# Patient Record
Sex: MALE | Race: WHITE | NOT HISPANIC OR LATINO
[De-identification: names, ages, dates, MRNs, and addresses within clinical notes are randomized per-mention and may not be internally consistent; named-entity substitution may affect disease eponyms.]

---

## 2017-02-06 ENCOUNTER — APPOINTMENT (OUTPATIENT)
Dept: PULMONOLOGY | Facility: CLINIC | Age: 82
End: 2017-02-06

## 2017-03-22 ENCOUNTER — APPOINTMENT (OUTPATIENT)
Dept: UROLOGY | Facility: CLINIC | Age: 82
End: 2017-03-22

## 2017-03-22 DIAGNOSIS — N20.0 CALCULUS OF KIDNEY: ICD-10-CM

## 2017-03-22 DIAGNOSIS — R31.29 OTHER MICROSCOPIC HEMATURIA: ICD-10-CM

## 2017-03-22 DIAGNOSIS — R35.0 FREQUENCY OF MICTURITION: ICD-10-CM

## 2017-04-26 ENCOUNTER — APPOINTMENT (OUTPATIENT)
Dept: PULMONOLOGY | Facility: CLINIC | Age: 82
End: 2017-04-26

## 2017-04-26 VITALS
RESPIRATION RATE: 15 BRPM | HEIGHT: 69 IN | BODY MASS INDEX: 21.03 KG/M2 | WEIGHT: 142 LBS | DIASTOLIC BLOOD PRESSURE: 76 MMHG | HEART RATE: 61 BPM | SYSTOLIC BLOOD PRESSURE: 126 MMHG | OXYGEN SATURATION: 98 %

## 2017-04-26 RX ORDER — LISINOPRIL 20 MG/1
20 TABLET ORAL
Qty: 90 | Refills: 0 | Status: ACTIVE | COMMUNITY
Start: 2016-01-21

## 2017-04-26 RX ORDER — AZELASTINE HYDROCHLORIDE 137 UG/1
0.1 SPRAY, METERED NASAL TWICE DAILY
Qty: 3 | Refills: 1 | Status: ACTIVE | COMMUNITY
Start: 2017-04-26 | End: 1900-01-01

## 2017-04-26 RX ORDER — SIMVASTATIN 80 MG/1
80 TABLET, FILM COATED ORAL
Qty: 90 | Refills: 0 | Status: ACTIVE | COMMUNITY
Start: 2016-05-03

## 2017-04-26 RX ORDER — ALPRAZOLAM 0.5 MG/1
0.5 TABLET ORAL
Qty: 90 | Refills: 0 | Status: ACTIVE | COMMUNITY
Start: 2017-01-10

## 2017-04-26 RX ORDER — MUPIROCIN 20 MG/G
2 OINTMENT TOPICAL
Qty: 22 | Refills: 0 | Status: ACTIVE | COMMUNITY
Start: 2017-03-07

## 2017-04-26 RX ORDER — CARVEDILOL 3.12 MG/1
3.12 TABLET, FILM COATED ORAL
Qty: 180 | Refills: 0 | Status: ACTIVE | COMMUNITY
Start: 2016-11-25

## 2017-06-22 ENCOUNTER — APPOINTMENT (OUTPATIENT)
Dept: PULMONOLOGY | Facility: CLINIC | Age: 82
End: 2017-06-22

## 2017-06-22 ENCOUNTER — MEDICATION RENEWAL (OUTPATIENT)
Age: 82
End: 2017-06-22

## 2017-06-22 VITALS
RESPIRATION RATE: 15 BRPM | SYSTOLIC BLOOD PRESSURE: 135 MMHG | DIASTOLIC BLOOD PRESSURE: 70 MMHG | HEIGHT: 69 IN | OXYGEN SATURATION: 93 % | WEIGHT: 142 LBS | HEART RATE: 71 BPM | BODY MASS INDEX: 21.03 KG/M2

## 2017-07-10 RX ORDER — OXYBUTYNIN CHLORIDE 10 MG/1
10 TABLET, EXTENDED RELEASE ORAL DAILY
Qty: 90 | Refills: 3 | Status: ACTIVE | COMMUNITY
Start: 2017-07-10 | End: 1900-01-01

## 2017-07-20 ENCOUNTER — MEDICATION RENEWAL (OUTPATIENT)
Age: 82
End: 2017-07-20

## 2017-07-20 RX ORDER — MIRABEGRON 50 MG/1
50 TABLET, FILM COATED, EXTENDED RELEASE ORAL
Qty: 90 | Refills: 3 | Status: ACTIVE | COMMUNITY
Start: 2017-07-20 | End: 1900-01-01

## 2017-07-26 ENCOUNTER — APPOINTMENT (OUTPATIENT)
Dept: PULMONOLOGY | Facility: CLINIC | Age: 82
End: 2017-07-26

## 2017-07-26 VITALS
DIASTOLIC BLOOD PRESSURE: 80 MMHG | RESPIRATION RATE: 16 BRPM | HEIGHT: 69 IN | BODY MASS INDEX: 22.22 KG/M2 | SYSTOLIC BLOOD PRESSURE: 130 MMHG | WEIGHT: 150 LBS | OXYGEN SATURATION: 95 % | HEART RATE: 81 BPM

## 2017-07-26 DIAGNOSIS — R26.89 OTHER ABNORMALITIES OF GAIT AND MOBILITY: ICD-10-CM

## 2017-07-26 RX ORDER — CLARITHROMYCIN 500 MG/1
500 TABLET, FILM COATED ORAL
Qty: 20 | Refills: 0 | Status: DISCONTINUED | COMMUNITY
Start: 2017-06-22 | End: 2017-07-26

## 2017-08-25 RX ORDER — DOXYCYCLINE HYCLATE 100 MG/1
100 CAPSULE ORAL
Qty: 20 | Refills: 0 | Status: ACTIVE | COMMUNITY
Start: 2017-08-25 | End: 1900-01-01

## 2017-08-25 RX ORDER — PREDNISONE 10 MG/1
10 TABLET ORAL
Qty: 1 | Refills: 0 | Status: ACTIVE | COMMUNITY
Start: 2017-08-25 | End: 1900-01-01

## 2017-08-28 ENCOUNTER — APPOINTMENT (OUTPATIENT)
Dept: PULMONOLOGY | Facility: CLINIC | Age: 82
End: 2017-08-28
Payer: MEDICARE

## 2017-08-28 VITALS
SYSTOLIC BLOOD PRESSURE: 120 MMHG | WEIGHT: 142 LBS | BODY MASS INDEX: 21.03 KG/M2 | OXYGEN SATURATION: 96 % | DIASTOLIC BLOOD PRESSURE: 75 MMHG | HEIGHT: 69 IN | HEART RATE: 72 BPM

## 2017-08-28 PROCEDURE — 99214 OFFICE O/P EST MOD 30 MIN: CPT | Mod: 25

## 2017-08-28 PROCEDURE — 71020: CPT

## 2017-08-28 RX ORDER — METOPROLOL SUCCINATE 25 MG/1
25 TABLET, EXTENDED RELEASE ORAL
Qty: 30 | Refills: 0 | Status: ACTIVE | COMMUNITY
Start: 2017-08-14

## 2017-08-28 RX ORDER — PRAVASTATIN SODIUM 20 MG/1
20 TABLET ORAL
Qty: 90 | Refills: 0 | Status: ACTIVE | COMMUNITY
Start: 2017-07-11

## 2017-08-28 RX ORDER — METHYLPREDNISOLONE 4 MG/1
4 TABLET ORAL
Qty: 21 | Refills: 0 | Status: DISCONTINUED | COMMUNITY
Start: 2017-06-27 | End: 2017-08-28

## 2017-08-28 RX ORDER — LOSARTAN POTASSIUM 50 MG/1
50 TABLET, FILM COATED ORAL
Qty: 30 | Refills: 0 | Status: ACTIVE | COMMUNITY
Start: 2017-06-27

## 2017-09-19 ENCOUNTER — INPATIENT (INPATIENT)
Facility: HOSPITAL | Age: 82
LOS: 9 days | Discharge: HOME HEALTH | DRG: 481 | End: 2017-09-29
Attending: ORTHOPAEDIC SURGERY | Admitting: ORTHOPAEDIC SURGERY
Payer: MEDICARE

## 2017-09-19 VITALS
OXYGEN SATURATION: 97 % | RESPIRATION RATE: 16 BRPM | SYSTOLIC BLOOD PRESSURE: 140 MMHG | HEART RATE: 70 BPM | DIASTOLIC BLOOD PRESSURE: 90 MMHG

## 2017-09-19 DIAGNOSIS — S72.009A FRACTURE OF UNSPECIFIED PART OF NECK OF UNSPECIFIED FEMUR, INITIAL ENCOUNTER FOR CLOSED FRACTURE: ICD-10-CM

## 2017-09-19 LAB
ALBUMIN SERPL ELPH-MCNC: 3.1 G/DL — LOW (ref 3.3–5)
ALP SERPL-CCNC: 90 U/L — SIGNIFICANT CHANGE UP (ref 40–120)
ALT FLD-CCNC: 18 U/L RC — SIGNIFICANT CHANGE UP (ref 10–45)
ANION GAP SERPL CALC-SCNC: 14 MMOL/L — SIGNIFICANT CHANGE UP (ref 5–17)
APPEARANCE UR: CLEAR — SIGNIFICANT CHANGE UP
APTT BLD: 33.1 SEC — SIGNIFICANT CHANGE UP (ref 27.5–37.4)
AST SERPL-CCNC: 21 U/L — SIGNIFICANT CHANGE UP (ref 10–40)
BASOPHILS # BLD AUTO: 0.1 K/UL — SIGNIFICANT CHANGE UP (ref 0–0.2)
BASOPHILS NFR BLD AUTO: 0.6 % — SIGNIFICANT CHANGE UP (ref 0–2)
BILIRUB SERPL-MCNC: 0.5 MG/DL — SIGNIFICANT CHANGE UP (ref 0.2–1.2)
BILIRUB UR-MCNC: NEGATIVE — SIGNIFICANT CHANGE UP
BLD GP AB SCN SERPL QL: NEGATIVE — SIGNIFICANT CHANGE UP
BUN SERPL-MCNC: 15 MG/DL — SIGNIFICANT CHANGE UP (ref 7–23)
CALCIUM SERPL-MCNC: 8.6 MG/DL — SIGNIFICANT CHANGE UP (ref 8.4–10.5)
CHLORIDE SERPL-SCNC: 93 MMOL/L — LOW (ref 96–108)
CO2 SERPL-SCNC: 21 MMOL/L — LOW (ref 22–31)
COLOR SPEC: SIGNIFICANT CHANGE UP
CREAT SERPL-MCNC: 0.88 MG/DL — SIGNIFICANT CHANGE UP (ref 0.5–1.3)
DIFF PNL FLD: NEGATIVE — SIGNIFICANT CHANGE UP
EOSINOPHIL # BLD AUTO: 0.1 K/UL — SIGNIFICANT CHANGE UP (ref 0–0.5)
EOSINOPHIL NFR BLD AUTO: 0.8 % — SIGNIFICANT CHANGE UP (ref 0–6)
GLUCOSE SERPL-MCNC: 109 MG/DL — HIGH (ref 70–99)
GLUCOSE UR QL: NEGATIVE — SIGNIFICANT CHANGE UP
HCT VFR BLD CALC: 33 % — LOW (ref 39–50)
HGB BLD-MCNC: 11.3 G/DL — LOW (ref 13–17)
INR BLD: 1.12 RATIO — SIGNIFICANT CHANGE UP (ref 0.88–1.16)
KETONES UR-MCNC: NEGATIVE — SIGNIFICANT CHANGE UP
LEUKOCYTE ESTERASE UR-ACNC: NEGATIVE — SIGNIFICANT CHANGE UP
LYMPHOCYTES # BLD AUTO: 1.2 K/UL — SIGNIFICANT CHANGE UP (ref 1–3.3)
LYMPHOCYTES # BLD AUTO: 13.1 % — SIGNIFICANT CHANGE UP (ref 13–44)
MCHC RBC-ENTMCNC: 29.7 PG — SIGNIFICANT CHANGE UP (ref 27–34)
MCHC RBC-ENTMCNC: 34.2 GM/DL — SIGNIFICANT CHANGE UP (ref 32–36)
MCV RBC AUTO: 86.9 FL — SIGNIFICANT CHANGE UP (ref 80–100)
MONOCYTES # BLD AUTO: 0.7 K/UL — SIGNIFICANT CHANGE UP (ref 0–0.9)
MONOCYTES NFR BLD AUTO: 8.2 % — SIGNIFICANT CHANGE UP (ref 2–14)
NEUTROPHILS # BLD AUTO: 6.9 K/UL — SIGNIFICANT CHANGE UP (ref 1.8–7.4)
NEUTROPHILS NFR BLD AUTO: 77.2 % — HIGH (ref 43–77)
NITRITE UR-MCNC: NEGATIVE — SIGNIFICANT CHANGE UP
PH UR: 6 — SIGNIFICANT CHANGE UP (ref 5–8)
PLATELET # BLD AUTO: 218 K/UL — SIGNIFICANT CHANGE UP (ref 150–400)
POTASSIUM SERPL-MCNC: 4.5 MMOL/L — SIGNIFICANT CHANGE UP (ref 3.5–5.3)
POTASSIUM SERPL-SCNC: 4.5 MMOL/L — SIGNIFICANT CHANGE UP (ref 3.5–5.3)
PROT SERPL-MCNC: 7.2 G/DL — SIGNIFICANT CHANGE UP (ref 6–8.3)
PROT UR-MCNC: 30 MG/DL
PROTHROM AB SERPL-ACNC: 12.1 SEC — SIGNIFICANT CHANGE UP (ref 9.8–12.7)
RBC # BLD: 3.79 M/UL — LOW (ref 4.2–5.8)
RBC # FLD: 14.9 % — HIGH (ref 10.3–14.5)
RH IG SCN BLD-IMP: POSITIVE — SIGNIFICANT CHANGE UP
SODIUM SERPL-SCNC: 128 MMOL/L — LOW (ref 135–145)
SP GR SPEC: 1.01 — SIGNIFICANT CHANGE UP (ref 1.01–1.02)
UROBILINOGEN FLD QL: NEGATIVE — SIGNIFICANT CHANGE UP
WBC # BLD: 8.9 K/UL — SIGNIFICANT CHANGE UP (ref 3.8–10.5)
WBC # FLD AUTO: 8.9 K/UL — SIGNIFICANT CHANGE UP (ref 3.8–10.5)
WBC UR QL: SIGNIFICANT CHANGE UP /HPF (ref 0–5)

## 2017-09-19 PROCEDURE — 99285 EMERGENCY DEPT VISIT HI MDM: CPT | Mod: GC

## 2017-09-19 PROCEDURE — 73552 X-RAY EXAM OF FEMUR 2/>: CPT | Mod: 26,LT

## 2017-09-19 PROCEDURE — 71010: CPT | Mod: 26

## 2017-09-19 PROCEDURE — 73502 X-RAY EXAM HIP UNI 2-3 VIEWS: CPT | Mod: 26,LT

## 2017-09-19 RX ORDER — IPRATROPIUM BROMIDE 21 MCG
2 AEROSOL, SPRAY (ML) NASAL
Qty: 0 | Refills: 0 | COMMUNITY

## 2017-09-19 RX ORDER — IPRATROPIUM BROMIDE 21 MCG
1 AEROSOL, SPRAY (ML) NASAL
Qty: 0 | Refills: 0 | COMMUNITY

## 2017-09-19 RX ORDER — OXYCODONE HYDROCHLORIDE 5 MG/1
5 TABLET ORAL EVERY 4 HOURS
Qty: 0 | Refills: 0 | Status: DISCONTINUED | OUTPATIENT
Start: 2017-09-19 | End: 2017-09-20

## 2017-09-19 RX ORDER — ALBUTEROL 90 UG/1
3 AEROSOL, METERED ORAL
Qty: 0 | Refills: 0 | COMMUNITY

## 2017-09-19 RX ORDER — ACETAMINOPHEN 500 MG
650 TABLET ORAL EVERY 6 HOURS
Qty: 0 | Refills: 0 | Status: DISCONTINUED | OUTPATIENT
Start: 2017-09-19 | End: 2017-09-20

## 2017-09-19 RX ORDER — MORPHINE SULFATE 50 MG/1
4 CAPSULE, EXTENDED RELEASE ORAL ONCE
Qty: 0 | Refills: 0 | Status: DISCONTINUED | OUTPATIENT
Start: 2017-09-19 | End: 2017-09-19

## 2017-09-19 RX ORDER — SODIUM CHLORIDE 9 MG/ML
1000 INJECTION INTRAMUSCULAR; INTRAVENOUS; SUBCUTANEOUS
Qty: 0 | Refills: 0 | Status: DISCONTINUED | OUTPATIENT
Start: 2017-09-19 | End: 2017-09-20

## 2017-09-19 RX ORDER — ATORVASTATIN CALCIUM 80 MG/1
10 TABLET, FILM COATED ORAL AT BEDTIME
Qty: 0 | Refills: 0 | Status: DISCONTINUED | OUTPATIENT
Start: 2017-09-19 | End: 2017-09-20

## 2017-09-19 RX ORDER — LOSARTAN POTASSIUM 100 MG/1
50 TABLET, FILM COATED ORAL DAILY
Qty: 0 | Refills: 0 | Status: DISCONTINUED | OUTPATIENT
Start: 2017-09-19 | End: 2017-09-20

## 2017-09-19 RX ORDER — AZELASTINE 137 UG/1
1 SPRAY, METERED NASAL
Qty: 0 | Refills: 0 | COMMUNITY

## 2017-09-19 RX ORDER — LISINOPRIL 2.5 MG/1
1 TABLET ORAL
Qty: 0 | Refills: 0 | COMMUNITY

## 2017-09-19 RX ORDER — HEPARIN SODIUM 5000 [USP'U]/ML
5000 INJECTION INTRAVENOUS; SUBCUTANEOUS EVERY 8 HOURS
Qty: 0 | Refills: 0 | Status: COMPLETED | OUTPATIENT
Start: 2017-09-19 | End: 2017-09-19

## 2017-09-19 RX ORDER — MIRABEGRON 50 MG/1
1 TABLET, EXTENDED RELEASE ORAL
Qty: 0 | Refills: 0 | COMMUNITY

## 2017-09-19 RX ORDER — OXYCODONE HYDROCHLORIDE 5 MG/1
10 TABLET ORAL EVERY 4 HOURS
Qty: 0 | Refills: 0 | Status: DISCONTINUED | OUTPATIENT
Start: 2017-09-19 | End: 2017-09-20

## 2017-09-19 RX ORDER — FINASTERIDE 5 MG/1
5 TABLET, FILM COATED ORAL DAILY
Qty: 0 | Refills: 0 | Status: DISCONTINUED | OUTPATIENT
Start: 2017-09-19 | End: 2017-09-20

## 2017-09-19 RX ORDER — TAMSULOSIN HYDROCHLORIDE 0.4 MG/1
0.4 CAPSULE ORAL AT BEDTIME
Qty: 0 | Refills: 0 | Status: DISCONTINUED | OUTPATIENT
Start: 2017-09-19 | End: 2017-09-20

## 2017-09-19 RX ADMIN — HEPARIN SODIUM 5000 UNIT(S): 5000 INJECTION INTRAVENOUS; SUBCUTANEOUS at 21:42

## 2017-09-19 RX ADMIN — FINASTERIDE 5 MILLIGRAM(S): 5 TABLET, FILM COATED ORAL at 21:42

## 2017-09-19 RX ADMIN — LOSARTAN POTASSIUM 50 MILLIGRAM(S): 100 TABLET, FILM COATED ORAL at 21:42

## 2017-09-19 RX ADMIN — MORPHINE SULFATE 4 MILLIGRAM(S): 50 CAPSULE, EXTENDED RELEASE ORAL at 14:21

## 2017-09-19 RX ADMIN — ATORVASTATIN CALCIUM 10 MILLIGRAM(S): 80 TABLET, FILM COATED ORAL at 21:42

## 2017-09-19 RX ADMIN — OXYCODONE HYDROCHLORIDE 10 MILLIGRAM(S): 5 TABLET ORAL at 18:41

## 2017-09-19 RX ADMIN — TAMSULOSIN HYDROCHLORIDE 0.4 MILLIGRAM(S): 0.4 CAPSULE ORAL at 21:42

## 2017-09-19 RX ADMIN — OXYCODONE HYDROCHLORIDE 10 MILLIGRAM(S): 5 TABLET ORAL at 19:15

## 2017-09-19 RX ADMIN — MORPHINE SULFATE 4 MILLIGRAM(S): 50 CAPSULE, EXTENDED RELEASE ORAL at 13:51
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
## 2017-09-19 NOTE — H&P ADULT - NSHPLABSRESULTS_GEN_ALL_CORE
11.3   8.9   )-----------( 218      ( 19 Sep 2017 13:49 )             33.0       09-19    128<L>  |  93<L>  |  15  ----------------------------<  109<H>  4.5   |  21<L>  |  0.88        PT/INR - ( 19 Sep 2017 13:49 )   PT: 12.1 sec;   INR: 1.12 ratio         PTT - ( 19 Sep 2017 13:49 )  PTT:33.1 sec    CXR: Done  UA : Neg    Xray L hip/femur: Left intertrcoh fracture

## 2017-09-19 NOTE — H&P ADULT - ASSESSMENT
89 year old man with left intertroch fracture and hyponatremia  Plan For OR IN AM  Medical Consult Called for clearance/Hyponatremia  Pain Control  Hold DVT PPx for OR  Cards Clearance in Am ( Contacted)  AICD interrogation called  NPO pMN  IVF when NPO  NWB LLE  Discussed with attending

## 2017-09-19 NOTE — PROCEDURE NOTE - ADDITIONAL PROCEDURE DETAILS
PRE-OP: HIP SURGERY  1) Presenting rhythm: NSR at 60s  2) Measured data WNL, NL ICD function, Not PM dependent.  3) Stored data revealed no events for review since last interrogation on 8/14/17.  4) Changes made: Ventricular output increased from 2.0V to 2.5V based on measured threshold, this is being done to allow for 2:1 safety margin.  5) Would need to apply magnet to inhibit tachy-therapy at the time of UNIPOLAR cautery and then call EPS post-op to re-interrogate.    [id number]

## 2017-09-19 NOTE — ED PROVIDER NOTE - PSH
Detached retina    ICD (implantable cardioverter-defibrillator) in place  also ppm  S/P CABG (coronary artery bypass graft)    S/P cholecystectomy    Status post cataract extraction  bilateral

## 2017-09-19 NOTE — H&P ADULT - HISTORY OF PRESENT ILLNESS
89 year old male s/p mechanical fall down 1 stair presented to ER with left hip pain and inability to ambulate.  No head trauma or LOC.  Ambulates with cane at baseline.  Had AICD ([employer] [id number]) replaced 2 months ago due to battery.  No weakness, numbness, tingling chest pain, SOB.  Not on blood thinners.

## 2017-09-19 NOTE — ED PROVIDER NOTE - OBJECTIVE STATEMENT
Pt is 89  getting out of pool and pt slipped on step and hit his left hip. Pt denies hitting head, did not hit head/LOC. Pt is on small aspirin. Carlo Mcconnell is PMD. Pt denies cp, dizziness, other precipitating factors before fall (simply slipped).      ff Pt is 89M pmh CAD, HTN, asthma, was getting out of pool and slipped on step and hit his left hip. Pt denies hitting head, LOC. Pt denies precipitating factors ie CP, dizziness prior to fall. Was simply a misstep. Pt is on a baby aspirin daily. Carlo Rasconu is PMD. Pt is in 10/10 non-radiating pain in his left leg/hip. Pt has no drug allergies.

## 2017-09-19 NOTE — ED ADULT NURSE NOTE - OBJECTIVE STATEMENT
89 y/o M pt w/ pmh CAD, HTN, asthma, present to ED for left hip pain, was getting out of pool and slipped on step and hit his left hip. Pt denies hitting head, - LOC, on exam pain to left hip, deformed, external rotated, left leg shorten, Pt is on a baby aspirin daily. No other injuries, deformity, labs drawn and sent, EKG done and given to MD, 16fr delgado was placed draining yellow urine freely, pt medicated with pain meds as ordered

## 2017-09-19 NOTE — ED PROVIDER NOTE - ATTENDING CONTRIBUTION TO CARE
89 y/o m with pmhx CAD, HTN, Asthma, presents for pain on left hip. fell as he was getting out of pool and slipped on step and hit his left hip. Pt denies hitting head, No LOC. No pre or post chest pain / dizziness, no heart palp.  no weakness or numbness. Taking ASA 81 mg. No incontinence of bowel or bladder.  PMD Dr. Carlo Mcconnell   Gen. no acute distress, Non toxic   HEENT: EOMI, mmm,   Lungs: CTAB/L no C/ W /R   CVS: S1S2   Abd; Soft non tender, non distended   Ext: left lower extremity externally rotated / shortened. distal neurovasc intact. + ttp on left hip. decreased ROM on left side.   Neuro AAOx3 non focal clear speech

## 2017-09-19 NOTE — ED PROVIDER NOTE - MEDICAL DECISION MAKING DETAILS
89M w/ severe left hip pain s/p fall while exiting the pool. No precipitating factors. Likely fracture based on PE, plan for pain control and xray w/ ortho f/u 89M w/ severe left hip pain s/p fall while exiting the pool. No precipitating factors. Likely fracture based on PE, plan for pain control and xray w/ ortho f/u  ATTD: left leg pain, concern for fracture / dislocation - pain medication, xr, labs, re eval for dispo.

## 2017-09-20 ENCOUNTER — TRANSCRIPTION ENCOUNTER (OUTPATIENT)
Age: 82
End: 2017-09-20

## 2017-09-20 DIAGNOSIS — N40.0 BENIGN PROSTATIC HYPERPLASIA WITHOUT LOWER URINARY TRACT SYMPTOMS: ICD-10-CM

## 2017-09-20 DIAGNOSIS — J45.909 UNSPECIFIED ASTHMA, UNCOMPLICATED: ICD-10-CM

## 2017-09-20 DIAGNOSIS — I50.22 CHRONIC SYSTOLIC (CONGESTIVE) HEART FAILURE: ICD-10-CM

## 2017-09-20 DIAGNOSIS — E78.5 HYPERLIPIDEMIA, UNSPECIFIED: ICD-10-CM

## 2017-09-20 DIAGNOSIS — I25.10 ATHEROSCLEROTIC HEART DISEASE OF NATIVE CORONARY ARTERY WITHOUT ANGINA PECTORIS: ICD-10-CM

## 2017-09-20 DIAGNOSIS — I10 ESSENTIAL (PRIMARY) HYPERTENSION: ICD-10-CM

## 2017-09-20 DIAGNOSIS — R52 PAIN, UNSPECIFIED: ICD-10-CM

## 2017-09-20 DIAGNOSIS — S72.009A FRACTURE OF UNSPECIFIED PART OF NECK OF UNSPECIFIED FEMUR, INITIAL ENCOUNTER FOR CLOSED FRACTURE: ICD-10-CM

## 2017-09-20 LAB
ANION GAP SERPL CALC-SCNC: 10 MMOL/L — SIGNIFICANT CHANGE UP (ref 5–17)
ANION GAP SERPL CALC-SCNC: 12 MMOL/L — SIGNIFICANT CHANGE UP (ref 5–17)
APTT BLD: 30 SEC — SIGNIFICANT CHANGE UP (ref 27.5–37.4)
BASOPHILS # BLD AUTO: 0 K/UL — SIGNIFICANT CHANGE UP (ref 0–0.2)
BASOPHILS NFR BLD AUTO: 0.2 % — SIGNIFICANT CHANGE UP (ref 0–2)
BLD GP AB SCN SERPL QL: NEGATIVE — SIGNIFICANT CHANGE UP
BUN SERPL-MCNC: 16 MG/DL — SIGNIFICANT CHANGE UP (ref 7–23)
BUN SERPL-MCNC: 16 MG/DL — SIGNIFICANT CHANGE UP (ref 7–23)
BUN SERPL-MCNC: 19 MG/DL — SIGNIFICANT CHANGE UP (ref 7–23)
BUN SERPL-MCNC: 21 MG/DL — SIGNIFICANT CHANGE UP (ref 7–23)
CALCIUM SERPL-MCNC: 8.1 MG/DL — LOW (ref 8.4–10.5)
CALCIUM SERPL-MCNC: 8.2 MG/DL — LOW (ref 8.4–10.5)
CALCIUM SERPL-MCNC: 8.2 MG/DL — LOW (ref 8.4–10.5)
CALCIUM SERPL-MCNC: 8.5 MG/DL — SIGNIFICANT CHANGE UP (ref 8.4–10.5)
CHLORIDE SERPL-SCNC: 93 MMOL/L — LOW (ref 96–108)
CHLORIDE SERPL-SCNC: 94 MMOL/L — LOW (ref 96–108)
CHLORIDE SERPL-SCNC: 95 MMOL/L — LOW (ref 96–108)
CHLORIDE SERPL-SCNC: 95 MMOL/L — LOW (ref 96–108)
CO2 SERPL-SCNC: 21 MMOL/L — LOW (ref 22–31)
CO2 SERPL-SCNC: 21 MMOL/L — LOW (ref 22–31)
CO2 SERPL-SCNC: 22 MMOL/L — SIGNIFICANT CHANGE UP (ref 22–31)
CO2 SERPL-SCNC: 23 MMOL/L — SIGNIFICANT CHANGE UP (ref 22–31)
CREAT SERPL-MCNC: 0.88 MG/DL — SIGNIFICANT CHANGE UP (ref 0.5–1.3)
CREAT SERPL-MCNC: 0.88 MG/DL — SIGNIFICANT CHANGE UP (ref 0.5–1.3)
CREAT SERPL-MCNC: 1.04 MG/DL — SIGNIFICANT CHANGE UP (ref 0.5–1.3)
CREAT SERPL-MCNC: 1.05 MG/DL — SIGNIFICANT CHANGE UP (ref 0.5–1.3)
EOSINOPHIL # BLD AUTO: 0 K/UL — SIGNIFICANT CHANGE UP (ref 0–0.5)
EOSINOPHIL NFR BLD AUTO: 0.2 % — SIGNIFICANT CHANGE UP (ref 0–6)
GAS PNL BLDV: SIGNIFICANT CHANGE UP
GLUCOSE SERPL-MCNC: 127 MG/DL — HIGH (ref 70–99)
GLUCOSE SERPL-MCNC: 141 MG/DL — HIGH (ref 70–99)
GLUCOSE SERPL-MCNC: 143 MG/DL — HIGH (ref 70–99)
GLUCOSE SERPL-MCNC: 184 MG/DL — HIGH (ref 70–99)
HCT VFR BLD CALC: 29.3 % — LOW (ref 39–50)
HCT VFR BLD CALC: 29.5 % — LOW (ref 39–50)
HCT VFR BLD CALC: 32.7 % — LOW (ref 39–50)
HGB BLD-MCNC: 10.8 G/DL — LOW (ref 13–17)
HGB BLD-MCNC: 9.7 G/DL — LOW (ref 13–17)
HGB BLD-MCNC: 9.8 G/DL — LOW (ref 13–17)
INR BLD: 1.12 RATIO — SIGNIFICANT CHANGE UP (ref 0.88–1.16)
LYMPHOCYTES # BLD AUTO: 1.4 K/UL — SIGNIFICANT CHANGE UP (ref 1–3.3)
LYMPHOCYTES # BLD AUTO: 11 % — LOW (ref 13–44)
MAGNESIUM SERPL-MCNC: 1.7 MG/DL — SIGNIFICANT CHANGE UP (ref 1.6–2.6)
MCHC RBC-ENTMCNC: 28.8 PG — SIGNIFICANT CHANGE UP (ref 27–34)
MCHC RBC-ENTMCNC: 28.9 PG — SIGNIFICANT CHANGE UP (ref 27–34)
MCHC RBC-ENTMCNC: 28.9 PG — SIGNIFICANT CHANGE UP (ref 27–34)
MCHC RBC-ENTMCNC: 33 GM/DL — SIGNIFICANT CHANGE UP (ref 32–36)
MCHC RBC-ENTMCNC: 33.1 GM/DL — SIGNIFICANT CHANGE UP (ref 32–36)
MCHC RBC-ENTMCNC: 33.2 GM/DL — SIGNIFICANT CHANGE UP (ref 32–36)
MCV RBC AUTO: 86.9 FL — SIGNIFICANT CHANGE UP (ref 80–100)
MCV RBC AUTO: 87.1 FL — SIGNIFICANT CHANGE UP (ref 80–100)
MCV RBC AUTO: 87.3 FL — SIGNIFICANT CHANGE UP (ref 80–100)
MONOCYTES # BLD AUTO: 1 K/UL — HIGH (ref 0–0.9)
MONOCYTES NFR BLD AUTO: 8 % — SIGNIFICANT CHANGE UP (ref 2–14)
NEUTROPHILS # BLD AUTO: 9.9 K/UL — HIGH (ref 1.8–7.4)
NEUTROPHILS NFR BLD AUTO: 80.6 % — HIGH (ref 43–77)
PHOSPHATE SERPL-MCNC: 3.6 MG/DL — SIGNIFICANT CHANGE UP (ref 2.5–4.5)
PLATELET # BLD AUTO: 260 K/UL — SIGNIFICANT CHANGE UP (ref 150–400)
PLATELET # BLD AUTO: 272 K/UL — SIGNIFICANT CHANGE UP (ref 150–400)
PLATELET # BLD AUTO: 277 K/UL — SIGNIFICANT CHANGE UP (ref 150–400)
POTASSIUM SERPL-MCNC: 4.7 MMOL/L — SIGNIFICANT CHANGE UP (ref 3.5–5.3)
POTASSIUM SERPL-MCNC: 5 MMOL/L — SIGNIFICANT CHANGE UP (ref 3.5–5.3)
POTASSIUM SERPL-MCNC: 5 MMOL/L — SIGNIFICANT CHANGE UP (ref 3.5–5.3)
POTASSIUM SERPL-MCNC: 5.7 MMOL/L — HIGH (ref 3.5–5.3)
POTASSIUM SERPL-SCNC: 4.7 MMOL/L — SIGNIFICANT CHANGE UP (ref 3.5–5.3)
POTASSIUM SERPL-SCNC: 5 MMOL/L — SIGNIFICANT CHANGE UP (ref 3.5–5.3)
POTASSIUM SERPL-SCNC: 5 MMOL/L — SIGNIFICANT CHANGE UP (ref 3.5–5.3)
POTASSIUM SERPL-SCNC: 5.7 MMOL/L — HIGH (ref 3.5–5.3)
PROTHROM AB SERPL-ACNC: 12.2 SEC — SIGNIFICANT CHANGE UP (ref 9.8–12.7)
RBC # BLD: 3.37 M/UL — LOW (ref 4.2–5.8)
RBC # BLD: 3.39 M/UL — LOW (ref 4.2–5.8)
RBC # BLD: 3.75 M/UL — LOW (ref 4.2–5.8)
RBC # FLD: 14.8 % — HIGH (ref 10.3–14.5)
RBC # FLD: 15.2 % — HIGH (ref 10.3–14.5)
RBC # FLD: 15.2 % — HIGH (ref 10.3–14.5)
RH IG SCN BLD-IMP: POSITIVE — SIGNIFICANT CHANGE UP
SODIUM SERPL-SCNC: 127 MMOL/L — LOW (ref 135–145)
SODIUM SERPL-SCNC: 127 MMOL/L — LOW (ref 135–145)
SODIUM SERPL-SCNC: 128 MMOL/L — LOW (ref 135–145)
SODIUM SERPL-SCNC: 128 MMOL/L — LOW (ref 135–145)
WBC # BLD: 10.9 K/UL — HIGH (ref 3.8–10.5)
WBC # BLD: 12.2 K/UL — HIGH (ref 3.8–10.5)
WBC # BLD: 6.8 K/UL — SIGNIFICANT CHANGE UP (ref 3.8–10.5)
WBC # FLD AUTO: 10.9 K/UL — HIGH (ref 3.8–10.5)
WBC # FLD AUTO: 12.2 K/UL — HIGH (ref 3.8–10.5)
WBC # FLD AUTO: 6.8 K/UL — SIGNIFICANT CHANGE UP (ref 3.8–10.5)

## 2017-09-20 PROCEDURE — 93010 ELECTROCARDIOGRAM REPORT: CPT

## 2017-09-20 PROCEDURE — 99223 1ST HOSP IP/OBS HIGH 75: CPT

## 2017-09-20 PROCEDURE — 71010: CPT | Mod: 26

## 2017-09-20 RX ORDER — SODIUM CHLORIDE 9 MG/ML
250 INJECTION INTRAMUSCULAR; INTRAVENOUS; SUBCUTANEOUS ONCE
Qty: 0 | Refills: 0 | Status: COMPLETED | OUTPATIENT
Start: 2017-09-20 | End: 2017-09-20

## 2017-09-20 RX ORDER — TAMSULOSIN HYDROCHLORIDE 0.4 MG/1
0.4 CAPSULE ORAL AT BEDTIME
Qty: 0 | Refills: 0 | Status: DISCONTINUED | OUTPATIENT
Start: 2017-09-20 | End: 2017-09-20

## 2017-09-20 RX ORDER — HYDROMORPHONE HYDROCHLORIDE 2 MG/ML
0.25 INJECTION INTRAMUSCULAR; INTRAVENOUS; SUBCUTANEOUS
Qty: 0 | Refills: 0 | Status: DISCONTINUED | OUTPATIENT
Start: 2017-09-20 | End: 2017-09-20

## 2017-09-20 RX ORDER — PHENYLEPHRINE HYDROCHLORIDE 10 MG/ML
0.4 INJECTION INTRAVENOUS
Qty: 80 | Refills: 0 | Status: DISCONTINUED | OUTPATIENT
Start: 2017-09-20 | End: 2017-09-20

## 2017-09-20 RX ORDER — RIVAROXABAN 15 MG-20MG
10 KIT ORAL DAILY
Qty: 0 | Refills: 0 | Status: DISCONTINUED | OUTPATIENT
Start: 2017-09-22 | End: 2017-09-25

## 2017-09-20 RX ORDER — SODIUM CHLORIDE 9 MG/ML
1000 INJECTION INTRAMUSCULAR; INTRAVENOUS; SUBCUTANEOUS
Qty: 0 | Refills: 0 | Status: DISCONTINUED | OUTPATIENT
Start: 2017-09-20 | End: 2017-09-22

## 2017-09-20 RX ORDER — HYDROMORPHONE HYDROCHLORIDE 2 MG/ML
0.5 INJECTION INTRAMUSCULAR; INTRAVENOUS; SUBCUTANEOUS EVERY 4 HOURS
Qty: 0 | Refills: 0 | Status: DISCONTINUED | OUTPATIENT
Start: 2017-09-20 | End: 2017-09-20

## 2017-09-20 RX ORDER — OXYCODONE HYDROCHLORIDE 5 MG/1
5 TABLET ORAL EVERY 4 HOURS
Qty: 0 | Refills: 0 | Status: DISCONTINUED | OUTPATIENT
Start: 2017-09-20 | End: 2017-09-20

## 2017-09-20 RX ORDER — CARVEDILOL PHOSPHATE 80 MG/1
3.12 CAPSULE, EXTENDED RELEASE ORAL EVERY 12 HOURS
Qty: 0 | Refills: 0 | Status: DISCONTINUED | OUTPATIENT
Start: 2017-09-20 | End: 2017-09-20

## 2017-09-20 RX ORDER — CEFAZOLIN SODIUM 1 G
2000 VIAL (EA) INJECTION EVERY 8 HOURS
Qty: 0 | Refills: 0 | Status: COMPLETED | OUTPATIENT
Start: 2017-09-20 | End: 2017-09-21

## 2017-09-20 RX ORDER — FINASTERIDE 5 MG/1
5 TABLET, FILM COATED ORAL DAILY
Qty: 0 | Refills: 0 | Status: DISCONTINUED | OUTPATIENT
Start: 2017-09-20 | End: 2017-09-29

## 2017-09-20 RX ORDER — ACETAMINOPHEN 500 MG
650 TABLET ORAL EVERY 6 HOURS
Qty: 0 | Refills: 0 | Status: DISCONTINUED | OUTPATIENT
Start: 2017-09-20 | End: 2017-09-29

## 2017-09-20 RX ORDER — ATORVASTATIN CALCIUM 80 MG/1
10 TABLET, FILM COATED ORAL AT BEDTIME
Qty: 0 | Refills: 0 | Status: DISCONTINUED | OUTPATIENT
Start: 2017-09-20 | End: 2017-09-29

## 2017-09-20 RX ORDER — LOSARTAN POTASSIUM 100 MG/1
50 TABLET, FILM COATED ORAL DAILY
Qty: 0 | Refills: 0 | Status: DISCONTINUED | OUTPATIENT
Start: 2017-09-20 | End: 2017-09-20

## 2017-09-20 RX ORDER — METOPROLOL TARTRATE 50 MG
25 TABLET ORAL DAILY
Qty: 0 | Refills: 0 | Status: DISCONTINUED | OUTPATIENT
Start: 2017-09-20 | End: 2017-09-20

## 2017-09-20 RX ORDER — TAMSULOSIN HYDROCHLORIDE 0.4 MG/1
0.4 CAPSULE ORAL AT BEDTIME
Qty: 0 | Refills: 0 | Status: DISCONTINUED | OUTPATIENT
Start: 2017-09-20 | End: 2017-09-29

## 2017-09-20 RX ORDER — OXYCODONE HYDROCHLORIDE 5 MG/1
10 TABLET ORAL EVERY 4 HOURS
Qty: 0 | Refills: 0 | Status: DISCONTINUED | OUTPATIENT
Start: 2017-09-20 | End: 2017-09-20

## 2017-09-20 RX ORDER — ONDANSETRON 8 MG/1
4 TABLET, FILM COATED ORAL EVERY 6 HOURS
Qty: 0 | Refills: 0 | Status: DISCONTINUED | OUTPATIENT
Start: 2017-09-20 | End: 2017-09-29

## 2017-09-20 RX ORDER — SODIUM CHLORIDE 9 MG/ML
1000 INJECTION, SOLUTION INTRAVENOUS
Qty: 0 | Refills: 0 | Status: DISCONTINUED | OUTPATIENT
Start: 2017-09-20 | End: 2017-09-20

## 2017-09-20 RX ADMIN — TAMSULOSIN HYDROCHLORIDE 0.4 MILLIGRAM(S): 0.4 CAPSULE ORAL at 21:03

## 2017-09-20 RX ADMIN — PHENYLEPHRINE HYDROCHLORIDE 9.65 MICROGRAM(S)/KG/MIN: 10 INJECTION INTRAVENOUS at 15:33

## 2017-09-20 RX ADMIN — SODIUM CHLORIDE 75 MILLILITER(S): 9 INJECTION INTRAMUSCULAR; INTRAVENOUS; SUBCUTANEOUS at 21:03

## 2017-09-20 RX ADMIN — OXYCODONE HYDROCHLORIDE 10 MILLIGRAM(S): 5 TABLET ORAL at 03:00

## 2017-09-20 RX ADMIN — OXYCODONE HYDROCHLORIDE 10 MILLIGRAM(S): 5 TABLET ORAL at 01:54

## 2017-09-20 RX ADMIN — SODIUM CHLORIDE 500 MILLILITER(S): 9 INJECTION INTRAMUSCULAR; INTRAVENOUS; SUBCUTANEOUS at 15:33

## 2017-09-20 RX ADMIN — SODIUM CHLORIDE 75 MILLILITER(S): 9 INJECTION, SOLUTION INTRAVENOUS at 13:15

## 2017-09-20 RX ADMIN — LOSARTAN POTASSIUM 50 MILLIGRAM(S): 100 TABLET, FILM COATED ORAL at 06:17

## 2017-09-20 RX ADMIN — ATORVASTATIN CALCIUM 10 MILLIGRAM(S): 80 TABLET, FILM COATED ORAL at 21:02

## 2017-09-20 RX ADMIN — Medication 100 MILLIGRAM(S): at 18:16

## 2017-09-20 NOTE — PROGRESS NOTE ADULT - SUBJECTIVE AND OBJECTIVE BOX
Patient seen and examined. Pain controlled.    Physical exam  VS: Afebrile, vital signs stable  Gen: NAD  LLE: skin intact. +EHL/FHL/TA/GSC. SILT L3-S1. +Dorsalis pedis, capillary refill brisk. Compartments soft and nontender.    A/P:  89M w/ L IT Fx    OR today (9/20/17) for ORIF L hip fx  NPO  IVF  Hold chemical AC  cardiac optimization  fu labs

## 2017-09-20 NOTE — PROGRESS NOTE ADULT - SUBJECTIVE AND OBJECTIVE BOX
Patient is a 89y old  Male who presents with a chief complaint of Left Hip Pain. seen today s/p ORIF of hip. Post op complicated by hypotension        MEDICATIONS  (STANDING):  ceFAZolin   IVPB 2000 milliGRAM(s) IV Intermittent every 8 hours  atorvastatin 10 milliGRAM(s) Oral at bedtime  finasteride 5 milliGRAM(s) Oral daily  tamsulosin 0.4 milliGRAM(s) Oral at bedtime  sodium chloride 0.9%. 1000 milliLiter(s) (75 mL/Hr) IV Continuous <Continuous>    MEDICATIONS  (PRN):  acetaminophen   Tablet 650 milliGRAM(s) Oral every 6 hours PRN For Temp greater than 38 C (100.4 F)  ondansetron Injectable 4 milliGRAM(s) IV Push every 6 hours PRN Nausea  oxyCODONE    IR 5 milliGRAM(s) Oral every 4 hours PRN Mild Pain (1 - 3)  oxyCODONE    IR 10 milliGRAM(s) Oral every 4 hours PRN Moderate Pain (4 - 6)  HYDROmorphone  Injectable 0.5 milliGRAM(s) IV Push every 4 hours PRN Severe Pain (7 - 10)    REVIEW OF SYSTEMS:  He has no headaches or dizziness.  His hearing and vision are good.  He has no sinusitis.  His dental work is compromised but he still has his own teeth.  He has no difficulty swallowing.  He has no shortness of breath, cough, congestion, or wheezing at the present time.  He has had seasonal allergies.  He has been treated by Dr. Norman Coughlin.  He has no chest pain, palpitations or arrhythmias with a history of ventricular arrhythmias treated with AICD placement.  He has no angina at this time.  He has no abdominal pain, change in bowel habits, or GI bleeding.  He has no GERD.  He has no constipation.  He has no other abdominal pain, 8 years status post gallstone pancreatitis.  He has hesitancy of urination with small output and nocturia x3.  He has no rashes or skin problems.  He has no diabetes or thyroid disease.  He has no joint pain with no limitations of function.  He has no neurological abnormalities and is perfectly oriented.        VITALS:   T(C): 36.8 (17 @ 22:06), Max: 37.3 (17 @ 21:00)  HR: 90 (17 @ 22:06) (66 - 98)  BP: 90/52 (17 @ 22:06) (73/45 - 156/72)  RR: 16 (17 @ 22:06) (14 - 18)  SpO2: 98% (17 @ 22:06) (94% - 100%)  Wt(kg): --    PHYSICAL EXAMINATION:  VITAL SIGNS:  At the time of admission, blood pressure is 130/70, pulse of 68 regular, respirations are 16.  He is afebrile.  HEENT:  Head and neck examination is normal.  There is no lymphadenopathy.  CHEST:  Clear with good breath sounds.  CARDIAC:  Shows no gallops or murmurs.  ABDOMEN:  Soft.  No masses, tenderness, guarding or rebound.  EXTREMITIES:  Without clubbing, cyanosis or edema.  His left hip rotates outward and everts 45 degrees and is 2 inches shorter than his right after intertrochanteric fracture.  NEUROLOGIC:  Normal with no focal deficits.    LABS:        CBC Full  -  ( 20 Sep 2017 19:47 )  WBC Count : 10.9 K/uL  Hemoglobin : 9.8 g/dL  Hematocrit : 29.5 %  Platelet Count - Automated : 272 K/uL  Mean Cell Volume : 87.1 fl  Mean Cell Hemoglobin : 28.8 pg  Mean Cell Hemoglobin Concentration : 33.0 gm/dL  Auto Neutrophil # : x  Auto Lymphocyte # : x  Auto Monocyte # : x  Auto Eosinophil # : x  Auto Basophil # : x  Auto Neutrophil % : x  Auto Lymphocyte % : x  Auto Monocyte % : x  Auto Eosinophil % : x  Auto Basophil % : x        128<L>  |  95<L>  |  21  ----------------------------<  184<H>  5.0   |  21<L>  |  1.05    Ca    8.2<L>      20 Sep 2017 19:47  Phos  3.6       Mg     1.7         TPro  7.2  /  Alb  3.1<L>  /  TBili  0.5  /  DBili  x   /  AST  21  /  ALT  18  /  AlkPhos  90      LIVER FUNCTIONS - ( 19 Sep 2017 13:49 )  Alb: 3.1 g/dL / Pro: 7.2 g/dL / ALK PHOS: 90 U/L / ALT: 18 U/L RC / AST: 21 U/L / GGT: x           PT/INR - ( 20 Sep 2017 07:11 )   PT: 12.2 sec;   INR: 1.12 ratio         PTT - ( 20 Sep 2017 07:11 )  PTT:30.0 sec  Urinalysis Basic - ( 19 Sep 2017 14:15 )    Color: x / Appearance: Clear / S.011 / pH: x  Gluc: x / Ketone: Negative  / Bili: Negative / Urobili: Negative   Blood: x / Protein: 30 mg/dL / Nitrite: Negative   Leuk Esterase: Negative / RBC: x / WBC 0-2 /HPF   Sq Epi: x / Non Sq Epi: x / Bacteria: x      CAPILLARY BLOOD GLUCOSE          RADIOLOGY & ADDITIONAL TESTS:

## 2017-09-20 NOTE — PROGRESS NOTE ADULT - SUBJECTIVE AND OBJECTIVE BOX
Patient cleared for floor per SICU.  Pain adequately controlled. No complaints per patient.  Hemodynamically stable, not requiring pressors.    SICU signing off  Signed out to Ortho Surg    Buster Baez, PGY-2

## 2017-09-20 NOTE — CONSULT NOTE ADULT - PROBLEM SELECTOR RECOMMENDATION 6
DVT prophylaxis. Patient is cleared for planned OR today. AICD interrogation in OR as per EP protocol.

## 2017-09-20 NOTE — PACU DISCHARGE NOTE - COMMENTS
Patient stable in PACU, alert and oriented x3, moving all extremities freely. Denies pain or NV at this time. Vital signs are within baseline. Per RN, patient briefly required Phenylephrine which was discontinued at 3pm, 6 hours ago. Since, BP has been stable off pressors. Stable from anesthesia stand-point for discharge to next level of care.

## 2017-09-20 NOTE — CONSULT NOTE ADULT - ASSESSMENT
89y Male with history of CAD S/P AWMI remote with LVEF 20-25%, chronic systolic CHF, AICD, essential hypertension, hyperlipidemia and reactive airways admitted after a mechanical fall with left hip fracture. He has no anginal symptoms and no CHF on exam without recent exacerbation. He will need his AICD interrogated as per EP protocol for OR. Will ask pulmonary to follow patient as well. He should continue his outpatient CV meds and inhalers. He should receive DVT prophylaxis post-operatively. He is cleared for planned OR today.
89 yo M with a h/o asthma, seasonal allergies, CAD, systolic CHF s/p AICD, HTN admitted s/p fall on to left hip with subsequent left hip fracture. Plan for OR for ORIF today.  Asthma history - states he is well controlled, using an Albuterol nebulizer 1-2x a day as needed.  Had an episode of acute bronchitis the end of August and is s/p course of antibiotics and Prednisone.  Currently denies SOB, wheezing or cough.  Not hypoxic, comfortable appearing and speaking in full sentences.  Left hip pain currently controlled    A/P: Asthma - mild intermittent, well controlled  Patient is low risk and medically optimized from a pulmonary perspective for surgery planned.  Recommend post-op monitoring hypoxemia, supplemental O2 goal sats >92%  Incentive spirometer use, Albuterol nebs Q4 hours as needed.  Pain control  DVT prophylaxis    Hyponatremia - unclear etiology, appears euvolemic. Would send urine electrolytes and close monitoring.     Discussed above recommendations with the patient and grandson at bedside.  Thank you for the consult   Dr. Coughlin to follow up on 9/21
89yM s/p mechanical fall, L IMN, w/ postop hypotension    Neuro:  - monitor mental status  - pain control w/ Oxy and Dilaudid PRN    Resp:  - monitor O2 sats  - wean NC as tolerated    CV:  - on Roscoe gtt at 0.4mcg/kg/min, wean as tolerated  - postop EKG  - check cardiac enzymes    GI:  - Regular diet    :  - d/c delgado  - monitor UOP  - NS @ 75cc/hr    ID:  - no acute infectious processes at this time  - monitor WBC and temp    Heme:  - monitor CBC  - Xarelto for VTE ppx per ortho    Endo:  - no acute concerns    Dispo:  - PACU for monitoring  - likely floor transfer  - SICU will continue to monitor

## 2017-09-20 NOTE — CHART NOTE - NSCHARTNOTEFT_GEN_A_CORE
Patient resting without complaints.  Denies chest pain, SOB, N/V.    T(C): 36.4 (09-20-17 @ 13:00)  T(F): 97.5 (09-20-17 @ 13:00)  HR: 97 (09-20-17 @ 17:00)  BP: 115/58 (09-20-17 @ 17:00)  RR: 16 (09-20-17 @ 17:00)  SpO2: 97% (09-20-17 @ 17:00)      Exam:  Alert and Oriented, No Acute Distress    Cardio: RRR S1,S2    Pulm: CTA B/L    L Lower Extremity:  Hip Dsg CDI  Calf Soft, Non-tender  +PF/DF  Sensation grossly intact                          10.8   12.2  )-----------( 277      ( 20 Sep 2017 12:41 )             32.7        127<L>  |  94<L>  |  19  ----------------------------<  143<H>  5.7<H>   |  21<L>  |  1.04      A/P: 89y Male S/P L IMN; Stable    -Pain Control  -DVT PPx; IS  -Am labs  -PT Eval-WBAT LLE  -Repeat BMP  -Continue Current Tx.    Kika Orozco PA-C  Orthopedic Surgery  Pagers [phone]

## 2017-09-21 DIAGNOSIS — E87.1 HYPO-OSMOLALITY AND HYPONATREMIA: ICD-10-CM

## 2017-09-21 DIAGNOSIS — R06.02 SHORTNESS OF BREATH: ICD-10-CM

## 2017-09-21 DIAGNOSIS — J30.9 ALLERGIC RHINITIS, UNSPECIFIED: ICD-10-CM

## 2017-09-21 DIAGNOSIS — Z29.9 ENCOUNTER FOR PROPHYLACTIC MEASURES, UNSPECIFIED: ICD-10-CM

## 2017-09-21 LAB
ANION GAP SERPL CALC-SCNC: 15 MMOL/L — SIGNIFICANT CHANGE UP (ref 5–17)
BASOPHILS # BLD AUTO: 0 K/UL — SIGNIFICANT CHANGE UP (ref 0–0.2)
BASOPHILS NFR BLD AUTO: 0.1 % — SIGNIFICANT CHANGE UP (ref 0–2)
BUN SERPL-MCNC: 24 MG/DL — HIGH (ref 7–23)
CALCIUM SERPL-MCNC: 8.5 MG/DL — SIGNIFICANT CHANGE UP (ref 8.4–10.5)
CHLORIDE SERPL-SCNC: 97 MMOL/L — SIGNIFICANT CHANGE UP (ref 96–108)
CO2 SERPL-SCNC: 22 MMOL/L — SIGNIFICANT CHANGE UP (ref 22–31)
CREAT SERPL-MCNC: 1.18 MG/DL — SIGNIFICANT CHANGE UP (ref 0.5–1.3)
EOSINOPHIL # BLD AUTO: 0 K/UL — SIGNIFICANT CHANGE UP (ref 0–0.5)
EOSINOPHIL NFR BLD AUTO: 0.1 % — SIGNIFICANT CHANGE UP (ref 0–6)
GLUCOSE SERPL-MCNC: 130 MG/DL — HIGH (ref 70–99)
HCT VFR BLD CALC: 24.5 % — LOW (ref 39–50)
HGB BLD-MCNC: 8.4 G/DL — LOW (ref 13–17)
LYMPHOCYTES # BLD AUTO: 1.6 K/UL — SIGNIFICANT CHANGE UP (ref 1–3.3)
LYMPHOCYTES # BLD AUTO: 16.9 % — SIGNIFICANT CHANGE UP (ref 13–44)
MCHC RBC-ENTMCNC: 30 PG — SIGNIFICANT CHANGE UP (ref 27–34)
MCHC RBC-ENTMCNC: 34.3 GM/DL — SIGNIFICANT CHANGE UP (ref 32–36)
MCV RBC AUTO: 87.4 FL — SIGNIFICANT CHANGE UP (ref 80–100)
MONOCYTES # BLD AUTO: 0.9 K/UL — SIGNIFICANT CHANGE UP (ref 0–0.9)
MONOCYTES NFR BLD AUTO: 9.3 % — SIGNIFICANT CHANGE UP (ref 2–14)
NEUTROPHILS # BLD AUTO: 6.8 K/UL — SIGNIFICANT CHANGE UP (ref 1.8–7.4)
NEUTROPHILS NFR BLD AUTO: 73.6 % — SIGNIFICANT CHANGE UP (ref 43–77)
PLATELET # BLD AUTO: 241 K/UL — SIGNIFICANT CHANGE UP (ref 150–400)
POTASSIUM SERPL-MCNC: 5.8 MMOL/L — HIGH (ref 3.5–5.3)
POTASSIUM SERPL-SCNC: 5.8 MMOL/L — HIGH (ref 3.5–5.3)
RBC # BLD: 2.8 M/UL — LOW (ref 4.2–5.8)
RBC # FLD: 15.2 % — HIGH (ref 10.3–14.5)
SODIUM SERPL-SCNC: 134 MMOL/L — LOW (ref 135–145)
WBC # BLD: 9.2 K/UL — SIGNIFICANT CHANGE UP (ref 3.8–10.5)
WBC # FLD AUTO: 9.2 K/UL — SIGNIFICANT CHANGE UP (ref 3.8–10.5)

## 2017-09-21 PROCEDURE — 99232 SBSQ HOSP IP/OBS MODERATE 35: CPT

## 2017-09-21 RX ORDER — ALBUTEROL 90 UG/1
2.5 AEROSOL, METERED ORAL EVERY 6 HOURS
Qty: 0 | Refills: 0 | Status: DISCONTINUED | OUTPATIENT
Start: 2017-09-21 | End: 2017-09-29

## 2017-09-21 RX ORDER — FLUTICASONE PROPIONATE 50 MCG
1 SPRAY, SUSPENSION NASAL
Qty: 0 | Refills: 0 | Status: DISCONTINUED | OUTPATIENT
Start: 2017-09-21 | End: 2017-09-29

## 2017-09-21 RX ORDER — BUDESONIDE AND FORMOTEROL FUMARATE DIHYDRATE 160; 4.5 UG/1; UG/1
2 AEROSOL RESPIRATORY (INHALATION)
Qty: 0 | Refills: 0 | Status: DISCONTINUED | OUTPATIENT
Start: 2017-09-21 | End: 2017-09-29

## 2017-09-21 RX ORDER — TIOTROPIUM BROMIDE 18 UG/1
1 CAPSULE ORAL; RESPIRATORY (INHALATION) DAILY
Qty: 0 | Refills: 0 | Status: DISCONTINUED | OUTPATIENT
Start: 2017-09-21 | End: 2017-09-29

## 2017-09-21 RX ORDER — FUROSEMIDE 40 MG
20 TABLET ORAL ONCE
Qty: 0 | Refills: 0 | Status: COMPLETED | OUTPATIENT
Start: 2017-09-21 | End: 2017-09-21

## 2017-09-21 RX ADMIN — Medication 100 MILLIGRAM(S): at 03:41

## 2017-09-21 RX ADMIN — FINASTERIDE 5 MILLIGRAM(S): 5 TABLET, FILM COATED ORAL at 12:28

## 2017-09-21 RX ADMIN — Medication 20 MILLIGRAM(S): at 18:36

## 2017-09-21 RX ADMIN — TIOTROPIUM BROMIDE 1 CAPSULE(S): 18 CAPSULE ORAL; RESPIRATORY (INHALATION) at 12:28

## 2017-09-21 RX ADMIN — ATORVASTATIN CALCIUM 10 MILLIGRAM(S): 80 TABLET, FILM COATED ORAL at 21:22

## 2017-09-21 RX ADMIN — TAMSULOSIN HYDROCHLORIDE 0.4 MILLIGRAM(S): 0.4 CAPSULE ORAL at 21:22

## 2017-09-21 RX ADMIN — BUDESONIDE AND FORMOTEROL FUMARATE DIHYDRATE 2 PUFF(S): 160; 4.5 AEROSOL RESPIRATORY (INHALATION) at 18:36

## 2017-09-21 RX ADMIN — Medication 1 SPRAY(S): at 18:36

## 2017-09-21 NOTE — PROGRESS NOTE ADULT - SUBJECTIVE AND OBJECTIVE BOX
CARDIOLOGY FOLLOW UP NOTE - DR. TAYLOR (FOR Dr. MARTIN)    Subjective:  no cp/sob    PHYSICAL EXAM:  T(C): 37 (09-21-17 @ 09:12), Max: 37.3 (09-20-17 @ 21:00)  HR: 95 (09-21-17 @ 09:12) (66 - 98)  BP: 104/59 (09-21-17 @ 09:12) (73/45 - 147/64)  RR: 18 (09-21-17 @ 09:12) (14 - 18)  SpO2: 94% (09-21-17 @ 09:12) (91% - 100%)  Wt(kg): --  I&O's Summary    20 Sep 2017 07:01  -  21 Sep 2017 07:00  --------------------------------------------------------  IN: 2510 mL / OUT: 690 mL / NET: 1820 mL        Appearance: Normal	  Cardiovascular: Normal S1 S2,RRR, No JVD, No murmurs  Respiratory: Lungs clear to auscultation	  Gastrointestinal:  Soft, Non-tender, + BS	  Extremities: Normal range of motion, No clubbing, cyanosis or edema      MEDICATIONS  (STANDING):  atorvastatin 10 milliGRAM(s) Oral at bedtime  finasteride 5 milliGRAM(s) Oral daily  tamsulosin 0.4 milliGRAM(s) Oral at bedtime  sodium chloride 0.9%. 1000 milliLiter(s) (75 mL/Hr) IV Continuous <Continuous>  buDESOnide 160 MICROgram(s)/formoterol 4.5 MICROgram(s) Inhaler 2 Puff(s) Inhalation two times a day  tiotropium 18 MICROgram(s) Capsule 1 Capsule(s) Inhalation daily  fluticasone propionate 50 MICROgram(s)/spray Nasal Spray 1 Spray(s) Both Nostrils two times a day      TELEMETRY: 	    ECG:  	  RADIOLOGY:   DIAGNOSTIC TESTING:  [ ] Echocardiogram:  [ ] Catheterization:  [ ] Stress Test:    OTHER: 	    LABS:	 	    CARDIAC MARKERS:                                8.4    9.2   )-----------( 241      ( 21 Sep 2017 06:16 )             24.5     09-21    134<L>  |  97  |  24<H>  ----------------------------<  130<H>  5.8<H>   |  22  |  1.18    Ca    8.5      21 Sep 2017 06:16  Phos  3.6     09-20  Mg     1.7     09-20    TPro  7.2  /  Alb  3.1<L>  /  TBili  0.5  /  DBili  x   /  AST  21  /  ALT  18  /  AlkPhos  90  09-19    proBNP:   PT/INR - ( 20 Sep 2017 07:11 )   PT: 12.2 sec;   INR: 1.12 ratio         PTT - ( 20 Sep 2017 07:11 )  PTT:30.0 sec  Lipid Profile:   HgA1c:

## 2017-09-21 NOTE — PROGRESS NOTE ADULT - SUBJECTIVE AND OBJECTIVE BOX
CHIEF COMPLAINT:    Interval Events:    REVIEW OF SYSTEMS:  Constitutional: No fevers or chills. No weight loss. No fatigue or generalized malaise.  Eyes: No itching or discharge from the eyes  ENT: No ear pain. No ear discharge. No nasal congestion. No post nasal drip. No epistaxis. No throat pain. No sore throat. No difficulty swallowing.   CV: No chest pain. No palpitations. No lightheadedness or dizziness.   Resp: No dyspnea at rest. No dyspnea on exertion. No orthopnea. No wheezing. No cough. No stridor. No sputum production. No chest pain with respiration.  GI: No nausea. No vomiting. No diarrhea.  MSK: No joint pain or pain in any extremities  Integumentary: No skin lesions. No pedal edema.  Neurological: No gross motor weakness. No sensory changes.  [ ] All other systems negative  [ ] Unable to assess ROS because ________    OBJECTIVE:  ICU Vital Signs Last 24 Hrs  T(C): 36.3 (21 Sep 2017 00:42), Max: 37.3 (20 Sep 2017 21:00)  T(F): 97.4 (21 Sep 2017 00:42), Max: 99.1 (20 Sep 2017 21:00)  HR: 89 (21 Sep 2017 00:42) (66 - 98)  BP: 103/61 (21 Sep 2017 00:42) (73/45 - 156/72)  BP(mean): 79 (20 Sep 2017 21:30) (56 - 92)  ABP: --  ABP(mean): --  RR: 17 (21 Sep 2017 00:42) (14 - 18)  SpO2: 92% (21 Sep 2017 00:42) (92% - 100%)         @ :  -   @ 07:00  --------------------------------------------------------  IN: 720 mL / OUT: 725 mL / NET: -5 mL     @ 07: @ 04:56  --------------------------------------------------------  IN: 2360 mL / OUT: 415 mL / NET: 1945 mL      CAPILLARY BLOOD GLUCOSE          PHYSICAL EXAM:  General: Awake, alert, oriented X 3.   HEENT: Atraumatic, normocephalic.                 Mallampatti Grade                 No nasal congestion.                No tonsillar or pharyngeal exudates.  Lymph Nodes: No palpable lymphadenopathy  Neck: No JVD. No carotid bruit.   Respiratory: Normal chest expansion                         Normal percussion                         Normal and equal air entry                         No wheeze, rhonchi or rales.  Cardiovascular: S1 S2 normal. No murmurs, rubs or gallops.   Abdomen: Soft, non-tender, non-distended. No organomegaly.  Extremities: Warm to touch. Peripheral pulse palpable. No pedal edema.   Skin: No rashes or skin lesions  Neurological: Motor and sensory examination equal and normal in all four extremities.  Psychiatry: Appropriate mood and affect.    HOSPITAL MEDICATIONS:  MEDICATIONS  (STANDING):  atorvastatin 10 milliGRAM(s) Oral at bedtime  finasteride 5 milliGRAM(s) Oral daily  tamsulosin 0.4 milliGRAM(s) Oral at bedtime  sodium chloride 0.9%. 1000 milliLiter(s) (75 mL/Hr) IV Continuous <Continuous>    MEDICATIONS  (PRN):  acetaminophen   Tablet 650 milliGRAM(s) Oral every 6 hours PRN For Temp greater than 38 C (100.4 F)  ondansetron Injectable 4 milliGRAM(s) IV Push every 6 hours PRN Nausea  oxyCODONE    IR 5 milliGRAM(s) Oral every 4 hours PRN Mild Pain (1 - 3)  oxyCODONE    IR 10 milliGRAM(s) Oral every 4 hours PRN Moderate Pain (4 - 6)  HYDROmorphone  Injectable 0.5 milliGRAM(s) IV Push every 4 hours PRN Severe Pain (7 - 10)      LABS:                        9.8    10.9  )-----------( 272      ( 20 Sep 2017 19:47 )             29.5     09-20    128<L>  |  95<L>  |  21  ----------------------------<  184<H>  5.0   |  21<L>  |  1.05    Ca    8.2<L>      20 Sep 2017 19:47  Phos  3.6       Mg     1.7         TPro  7.2  /  Alb  3.1<L>  /  TBili  0.5  /  DBili  x   /  AST  21  /  ALT  18  /  AlkPhos  90      PT/INR - ( 20 Sep 2017 07:11 )   PT: 12.2 sec;   INR: 1.12 ratio         PTT - ( 20 Sep 2017 07:11 )  PTT:30.0 sec  Urinalysis Basic - ( 19 Sep 2017 14:15 )    Color: x / Appearance: Clear / S.011 / pH: x  Gluc: x / Ketone: Negative  / Bili: Negative / Urobili: Negative   Blood: x / Protein: 30 mg/dL / Nitrite: Negative   Leuk Esterase: Negative / RBC: x / WBC 0-2 /HPF   Sq Epi: x / Non Sq Epi: x / Bacteria: x        Venous Blood Gas:   @ 19:44  7.35/41///  VBG Lactate: 2.2      MICROBIOLOGY:     RADIOLOGY:  [ ] Reviewed and interpreted by me    Point of Care Ultrasound Findings:    PFT:    EKG: CHIEF COMPLAINT:pain minimal, minimal sob--rare cough--constipated     Interval Events:s/p hip surgery    REVIEW OF SYSTEMS:  Constitutional: No fevers or chills. No weight loss. No fatigue or generalized malaise.  Eyes: No itching or discharge from the eyes  ENT: No ear pain. No ear discharge. No nasal congestion. No post nasal drip. No epistaxis. No throat pain. No sore throat. No difficulty swallowing.   CV: No chest pain. No palpitations. No lightheadedness or dizziness.   Resp: No dyspnea at rest. No dyspnea on exertion. No orthopnea. No wheezing. No cough. No stridor. No sputum production. No chest pain with respiration.  GI: No nausea. No vomiting. No diarrhea.  MSK: No joint pain or pain in any extremities  Integumentary: No skin lesions. No pedal edema.  Neurological: No gross motor weakness. No sensory changes.  [+ ] All other systems negative  [ ] Unable to assess ROS because ________    OBJECTIVE:  ICU Vital Signs Last 24 Hrs  T(C): 36.3 (21 Sep 2017 00:42), Max: 37.3 (20 Sep 2017 21:00)  T(F): 97.4 (21 Sep 2017 00:42), Max: 99.1 (20 Sep 2017 21:00)  HR: 89 (21 Sep 2017 00:42) (66 - 98)  BP: 103/61 (21 Sep 2017 00:42) (73/45 - 156/72)  BP(mean): 79 (20 Sep 2017 21:30) (56 - 92)  ABP: --  ABP(mean): --  RR: 17 (21 Sep 2017 00:42) (14 - 18)  SpO2: 92% (21 Sep 2017 00:42) (92% - 100%)         @ 07:  -   @ 07:00  --------------------------------------------------------  IN: 720 mL / OUT: 725 mL / NET: -5 mL     @ 07:  -   @ 04:56  --------------------------------------------------------  IN: 2360 mL / OUT: 415 mL / NET: 1945 mL      CAPILLARY BLOOD GLUCOSE          PHYSICAL EXAM:NAD in bed  General: Awake, alert, oriented X 3.   HEENT: Atraumatic, normocephalic.                 Mallampatti Grade 2                No nasal congestion.                No tonsillar or pharyngeal exudates.  Lymph Nodes: No palpable lymphadenopathy  Neck: No JVD. No carotid bruit.   Respiratory: Normal chest expansion                         Normal percussion                         Normal and equal air entry                         No wheeze, rhonchi or rales.  Cardiovascular: S1 S2 normal. + murmurs,no rubs or gallops.   Abdomen: Soft, non-tender, non-distended. No organomegaly.  Extremities: Warm to touch. Peripheral pulse palpable. No pedal edema.   Skin: No rashes or skin lesions  Neurological: Motor and sensory examination equal and normal in all four extremities.  Psychiatry: Appropriate mood and affect.    HOSPITAL MEDICATIONS:  MEDICATIONS  (STANDING):  atorvastatin 10 milliGRAM(s) Oral at bedtime  finasteride 5 milliGRAM(s) Oral daily  tamsulosin 0.4 milliGRAM(s) Oral at bedtime  sodium chloride 0.9%. 1000 milliLiter(s) (75 mL/Hr) IV Continuous <Continuous>    MEDICATIONS  (PRN):  acetaminophen   Tablet 650 milliGRAM(s) Oral every 6 hours PRN For Temp greater than 38 C (100.4 F)  ondansetron Injectable 4 milliGRAM(s) IV Push every 6 hours PRN Nausea  oxyCODONE    IR 5 milliGRAM(s) Oral every 4 hours PRN Mild Pain (1 - 3)  oxyCODONE    IR 10 milliGRAM(s) Oral every 4 hours PRN Moderate Pain (4 - 6)  HYDROmorphone  Injectable 0.5 milliGRAM(s) IV Push every 4 hours PRN Severe Pain (7 - 10)      LABS:                        9.8    10.9  )-----------( 272      ( 20 Sep 2017 19:47 )             29.5     09-20    128<L>  |  95<L>  |  21  ----------------------------<  184<H>  5.0   |  21<L>  |  1.05    Ca    8.2<L>      20 Sep 2017 19:47  Phos  3.6       Mg     1.7         TPro  7.2  /  Alb  3.1<L>  /  TBili  0.5  /  DBili  x   /  AST  21  /  ALT  18  /  AlkPhos  90      PT/INR - ( 20 Sep 2017 07:11 )   PT: 12.2 sec;   INR: 1.12 ratio         PTT - ( 20 Sep 2017 07:11 )  PTT:30.0 sec  Urinalysis Basic - ( 19 Sep 2017 14:15 )    Color: x / Appearance: Clear / S.011 / pH: x  Gluc: x / Ketone: Negative  / Bili: Negative / Urobili: Negative   Blood: x / Protein: 30 mg/dL / Nitrite: Negative   Leuk Esterase: Negative / RBC: x / WBC 0-2 /HPF   Sq Epi: x / Non Sq Epi: x / Bacteria: x        Venous Blood Gas:   @ 19:44  7.35/41//  VBG Lactate: 2.2      MICROBIOLOGY:     RADIOLOGY:  [ ] Reviewed and interpreted by me    Point of Care Ultrasound Findings:    PFT:    EKG:

## 2017-09-21 NOTE — PROGRESS NOTE ADULT - ASSESSMENT
89 yo M with a h/o asthma, seasonal allergies, CAD, systolic CHF s/p AICD, HTN admitted s/p fall on to left hip with subsequent left hip fracture. Plan for OR for ORIF today.  Asthma history - states he is well controlled, using an Albuterol nebulizer 1-2x a day as needed.  Had an episode of acute bronchitis the end of August and is s/p course of antibiotics and Prednisone.  Currently denies SOB, wheezing or cough.  Not hypoxic, comfortable appearing and speaking in full sentences.  Left hip pain currently controlled    A/P: Asthma - mild intermittent, well controlled  Patient is low risk and medically optimized from a pulmonary perspective for surgery planned.  Recommend post-op monitoring hypoxemia, supplemental O2 goal sats >92%  Incentive spirometer use, Albuterol nebs Q4 hours as needed.  Pain control  DVT prophylaxis    Hyponatremia - unclear etiology, appears euvolemic. Would send urine electrolytes and close monitoring.     Discussed above recommendations with the patient and grandson at bedside.  Thank you for the consult   Dr. Coughlin to follow up on 9/21 89 yo M with a h/o asthma, seasonal allergies, CAD, systolic CHF s/p AICD, HTN admitted s/p fall on to left hip with subsequent left hip fracture. Plan for OR for ORIF today.  Asthma history - states he is well controlled, using an Albuterol nebulizer 1-2x a day as needed.  Had an episode of acute bronchitis the end of August and is s/p course of antibiotics and Prednisone.  Currently denies SOB, wheezing or cough.  Not hypoxic, comfortable appearing and speaking in full sentences.  Left hip pain currently controlled     Asthma - mild well controlled      Hyponatremia - unclear etiology, appears euvolemic. Would send urine electrolytes and close monitoring.

## 2017-09-21 NOTE — PHYSICAL THERAPY INITIAL EVALUATION ADULT - ADDITIONAL COMMENTS
Pt states he lives with his wife in a apt with +elevator. Pt states he occasionally uses a cane to ambulate.

## 2017-09-21 NOTE — PROGRESS NOTE ADULT - ASSESSMENT
89y Male with history of CAD S/P AWMI remote with LVEF 20-25%, chronic systolic CHF, AICD, essential hypertension, hyperlipidemia and reactive airways admitted after a mechanical fall with left hip fracture.        CV stable post op   no chest pain/chf  icd no events  care per ortho   restart home bb/arb if sbp stable and can tolerate at discharge  dvt ppx 89y Male with history of CAD S/P AWMI remote with LVEF 20-25%, chronic systolic CHF, AICD, essential hypertension, hyperlipidemia and reactive airways admitted after a mechanical fall with left hip fracture.        CV stable post op   no chest pain/chf  icd no events  care per ortho   restart home bb if sbp stable and can tolerate at discharge  hold ARB (hyperkalemia)  dvt ppx

## 2017-09-21 NOTE — PROGRESS NOTE ADULT - PROBLEM SELECTOR PLAN 7
DVT prophlaxis:  subcutaneous lovenox or heparin as per primary team  sequential teds stockings  early ambulation  PT evaluation  early ambulation  incentive spirometry  GI prophylaxis:  PPI pre breakfast  aspiration precautions-all meals are to OOB as able

## 2017-09-21 NOTE — PHYSICAL THERAPY INITIAL EVALUATION ADULT - PERTINENT HX OF CURRENT PROBLEM, REHAB EVAL
Pt is a 89 y.o. male s/p mechanical fall down 1 stair presented to ER with left hip pain and inability to ambulate. +CXR 9/29/17: Pulmonary vascular congestion. Right-sided pleural effusion. +X-ray L femur 9/19/17: Comminuted, displaced left femoral intratrochanteric fracture with increased varus angulation.

## 2017-09-21 NOTE — PROGRESS NOTE ADULT - ATTENDING COMMENTS
as above- pulm. stable  pain control  early ambulation and rehab.    Norman Coughlin MD-Pulmonary   [phone]

## 2017-09-21 NOTE — PROGRESS NOTE ADULT - ASSESSMENT
89 yo M s/p L Hip IMN POD#1  -pain control  -DVT ppx  -WBAT  -PT, OOB  -FU Am labs  -Fluid Rest for Hyponatremia  -CHANO moran AM, TOV  -Will discuss with ortho team

## 2017-09-21 NOTE — PHYSICAL THERAPY INITIAL EVALUATION ADULT - ACTIVE RANGE OF MOTION EXAMINATION, REHAB EVAL
bilateral  lower extremity Active ROM was WFL (within functional limits)/unable to assess L hip due to pain/bilateral upper extremity Active ROM was WFL (within functional limits)

## 2017-09-21 NOTE — PHYSICAL THERAPY INITIAL EVALUATION ADULT - MANUAL MUSCLE TESTING RESULTS, REHAB EVAL
Strength is grossly at least 3/5 throughout; unable to assess L hip due to pain/grossly assessed due to

## 2017-09-21 NOTE — PROGRESS NOTE ADULT - SUBJECTIVE AND OBJECTIVE BOX
Pt seen & examined at bedside. Resting comfortably. No complaints overnight.  No N/V. No fever/chills.    AVSS, Afebrile    Vital Signs Last 24 Hrs  T(C): 36.7 (21 Sep 2017 05:14), Max: 37.3 (20 Sep 2017 21:00)  T(F): 98 (21 Sep 2017 05:14), Max: 99.1 (20 Sep 2017 21:00)  HR: 89 (21 Sep 2017 05:14) (66 - 98)  BP: 111/65 (21 Sep 2017 05:14) (73/45 - 156/72)  BP(mean): 79 (20 Sep 2017 21:30) (56 - 92)  RR: 17 (21 Sep 2017 05:14) (14 - 18)  SpO2: 91% (21 Sep 2017 05:14) (91% - 100%)    L Hip:  Dressing saturated proximally, dressing changed this AM, staples in place  SILT distally  2+ DP pulses, brisk CR  +EHL/FHL/GC/TA  Soft/NT calves

## 2017-09-21 NOTE — PROGRESS NOTE ADULT - SUBJECTIVE AND OBJECTIVE BOX
Patient is a 89y old  Male who presents with a chief complaint of Left Hip Pain (19 Sep 2017 16:48)        MEDICATIONS  (STANDING):  atorvastatin 10 milliGRAM(s) Oral at bedtime  finasteride 5 milliGRAM(s) Oral daily  tamsulosin 0.4 milliGRAM(s) Oral at bedtime  sodium chloride 0.9%. 1000 milliLiter(s) (75 mL/Hr) IV Continuous <Continuous>  buDESOnide 160 MICROgram(s)/formoterol 4.5 MICROgram(s) Inhaler 2 Puff(s) Inhalation two times a day  tiotropium 18 MICROgram(s) Capsule 1 Capsule(s) Inhalation daily  fluticasone propionate 50 MICROgram(s)/spray Nasal Spray 1 Spray(s) Both Nostrils two times a day    MEDICATIONS  (PRN):  acetaminophen   Tablet 650 milliGRAM(s) Oral every 6 hours PRN For Temp greater than 38 C (100.4 F)  ondansetron Injectable 4 milliGRAM(s) IV Push every 6 hours PRN Nausea  oxyCODONE    IR 5 milliGRAM(s) Oral every 4 hours PRN Mild Pain (1 - 3)  oxyCODONE    IR 10 milliGRAM(s) Oral every 4 hours PRN Moderate Pain (4 - 6)  HYDROmorphone  Injectable 0.5 milliGRAM(s) IV Push every 4 hours PRN Severe Pain (7 - 10)  ALBUTerol    0.083% 2.5 milliGRAM(s) Nebulizer every 6 hours PRN Shortness of Breath and/or Wheezing          REVIEW OF SYSTEMS:  CONSTITUTIONAL: No fever, change in weight, or fatigue  HEAD: No headache, dizziness or recent trauma  EYES: No eye pain, visual disturbances, or discharge  ENT:  No difficulty hearing, tinnitus, vertigo, No sinus or throat pain  NECK: No pain or stiffness  BREASTS: No pain, masses, or nipple discharge  RESPIRATORY: No cough, wheezing, chills or hemoptysis, No shortness of breath at rest or exertional shortness of breath  CARDIOVASCULAR: No chest pain, palpitations, dizziness, CHF, arrhythmia, cardiomegaly or leg swelling  GASTROINTESTINAL: No abdominal or epigastric pain. No nausea, vomiting, or hematemesis, No diarrhea or constipation. No melena or hematochezia.  GENITOURINARY: No dysuria, frequency, hematuria, or incontinence  SKIN: No itching, burning, rashes, or lesions   LYMPH NODES: No history of enlarged glands  ENDOCRINE: No heat or cold intolerance, No hair loss. No osteoporosis or thyroid disease  MUSCULOSKELETAL: leg pain  PSYCHIATRIC: No depression, anxiety, mood swings, or difficulty sleeping  HEME/LYMPH: No easy bruising, anticoagulants, bleeding disorder or bleeding gums  ALLERGY AND IMMUNOLOGIC: No hives or eczema  NEUROLOGICAL: No memory loss, loss of strength, numbness, or tremors      VITALS:   T(C): 36.4 (09-21-17 @ 20:48), Max: 37.1 (09-20-17 @ 22:58)  HR: 101 (09-21-17 @ 20:48) (86 - 107)  BP: 105/52 (09-21-17 @ 20:48) (92/60 - 135/69)  RR: 17 (09-21-17 @ 20:48) (16 - 20)  SpO2: 94% (09-21-17 @ 20:48) (91% - 100%)  Wt(kg): --    Physical exam:  General: WN/WD NAD  Neurology: A&Ox3, nonfocal, JORGE x 4  Eyes: PERRLA/ EOMI, Gross vision intact  ENT/Neck: Neck supple, trachea midline, No JVD, Gross hearing intact  Respiratory: CTA B/L, No wheezing, rales, rhonchi  CV: RRR, S1S2, no murmurs, rubs or gallops  Abdominal: Soft, NT, ND +BS,   Extremities: No edema, + peripheral pulses  Skin: No Rashes, Hematoma, Ecchymosis      LABS:        CBC Full  -  ( 21 Sep 2017 06:16 )  WBC Count : 9.2 K/uL  Hemoglobin : 8.4 g/dL  Hematocrit : 24.5 %  Platelet Count - Automated : 241 K/uL  Mean Cell Volume : 87.4 fl  Mean Cell Hemoglobin : 30.0 pg  Mean Cell Hemoglobin Concentration : 34.3 gm/dL  Auto Neutrophil # : 6.8 K/uL  Auto Lymphocyte # : 1.6 K/uL  Auto Monocyte # : 0.9 K/uL  Auto Eosinophil # : 0.0 K/uL  Auto Basophil # : 0.0 K/uL  Auto Neutrophil % : 73.6 %  Auto Lymphocyte % : 16.9 %  Auto Monocyte % : 9.3 %  Auto Eosinophil % : 0.1 %  Auto Basophil % : 0.1 %    09-21    134<L>  |  97  |  24<H>  ----------------------------<  130<H>  5.8<H>   |  22  |  1.18    Ca    8.5      21 Sep 2017 06:16  Phos  3.6     09-20  Mg     1.7     09-20        PT/INR - ( 20 Sep 2017 07:11 )   PT: 12.2 sec;   INR: 1.12 ratio         PTT - ( 20 Sep 2017 07:11 )  PTT:30.0 sec

## 2017-09-22 LAB
ANION GAP SERPL CALC-SCNC: 14 MMOL/L — SIGNIFICANT CHANGE UP (ref 5–17)
BLD GP AB SCN SERPL QL: NEGATIVE — SIGNIFICANT CHANGE UP
BUN SERPL-MCNC: 16 MG/DL — SIGNIFICANT CHANGE UP (ref 7–23)
CALCIUM SERPL-MCNC: 7.9 MG/DL — LOW (ref 8.4–10.5)
CHLORIDE SERPL-SCNC: 95 MMOL/L — LOW (ref 96–108)
CO2 SERPL-SCNC: 21 MMOL/L — LOW (ref 22–31)
CREAT SERPL-MCNC: 0.86 MG/DL — SIGNIFICANT CHANGE UP (ref 0.5–1.3)
GLUCOSE SERPL-MCNC: 106 MG/DL — HIGH (ref 70–99)
HCT VFR BLD CALC: 25.5 % — LOW (ref 39–50)
HCT VFR BLD CALC: 27.6 % — LOW (ref 39–50)
HGB BLD-MCNC: 8.6 G/DL — LOW (ref 13–17)
HGB BLD-MCNC: 9.2 G/DL — LOW (ref 13–17)
MCHC RBC-ENTMCNC: 28.7 PG — SIGNIFICANT CHANGE UP (ref 27–34)
MCHC RBC-ENTMCNC: 29 PG — SIGNIFICANT CHANGE UP (ref 27–34)
MCHC RBC-ENTMCNC: 33.3 GM/DL — SIGNIFICANT CHANGE UP (ref 32–36)
MCHC RBC-ENTMCNC: 33.7 GM/DL — SIGNIFICANT CHANGE UP (ref 32–36)
MCV RBC AUTO: 86 FL — SIGNIFICANT CHANGE UP (ref 80–100)
MCV RBC AUTO: 86.1 FL — SIGNIFICANT CHANGE UP (ref 80–100)
PLATELET # BLD AUTO: 249 K/UL — SIGNIFICANT CHANGE UP (ref 150–400)
PLATELET # BLD AUTO: 294 K/UL — SIGNIFICANT CHANGE UP (ref 150–400)
POTASSIUM SERPL-MCNC: 4.4 MMOL/L — SIGNIFICANT CHANGE UP (ref 3.5–5.3)
POTASSIUM SERPL-SCNC: 4.4 MMOL/L — SIGNIFICANT CHANGE UP (ref 3.5–5.3)
RBC # BLD: 2.96 M/UL — LOW (ref 4.2–5.8)
RBC # BLD: 3.21 M/UL — LOW (ref 4.2–5.8)
RBC # FLD: 15 % — HIGH (ref 10.3–14.5)
RBC # FLD: 15.2 % — HIGH (ref 10.3–14.5)
RH IG SCN BLD-IMP: POSITIVE — SIGNIFICANT CHANGE UP
SODIUM SERPL-SCNC: 130 MMOL/L — LOW (ref 135–145)
WBC # BLD: 7.6 K/UL — SIGNIFICANT CHANGE UP (ref 3.8–10.5)
WBC # BLD: 8 K/UL — SIGNIFICANT CHANGE UP (ref 3.8–10.5)
WBC # FLD AUTO: 7.6 K/UL — SIGNIFICANT CHANGE UP (ref 3.8–10.5)
WBC # FLD AUTO: 8 K/UL — SIGNIFICANT CHANGE UP (ref 3.8–10.5)

## 2017-09-22 PROCEDURE — 99232 SBSQ HOSP IP/OBS MODERATE 35: CPT

## 2017-09-22 PROCEDURE — 93010 ELECTROCARDIOGRAM REPORT: CPT | Mod: 76

## 2017-09-22 RX ORDER — METOPROLOL TARTRATE 50 MG
25 TABLET ORAL DAILY
Qty: 0 | Refills: 0 | Status: DISCONTINUED | OUTPATIENT
Start: 2017-09-22 | End: 2017-09-25

## 2017-09-22 RX ORDER — LOSARTAN POTASSIUM 100 MG/1
50 TABLET, FILM COATED ORAL DAILY
Qty: 0 | Refills: 0 | Status: DISCONTINUED | OUTPATIENT
Start: 2017-09-22 | End: 2017-09-29

## 2017-09-22 RX ORDER — SODIUM CHLORIDE 9 MG/ML
1000 INJECTION INTRAMUSCULAR; INTRAVENOUS; SUBCUTANEOUS
Qty: 0 | Refills: 0 | Status: DISCONTINUED | OUTPATIENT
Start: 2017-09-22 | End: 2017-09-23

## 2017-09-22 RX ORDER — SENNA PLUS 8.6 MG/1
2 TABLET ORAL AT BEDTIME
Qty: 0 | Refills: 0 | Status: DISCONTINUED | OUTPATIENT
Start: 2017-09-22 | End: 2017-09-29

## 2017-09-22 RX ORDER — DOCUSATE SODIUM 100 MG
100 CAPSULE ORAL DAILY
Qty: 0 | Refills: 0 | Status: DISCONTINUED | OUTPATIENT
Start: 2017-09-22 | End: 2017-09-29

## 2017-09-22 RX ADMIN — ATORVASTATIN CALCIUM 10 MILLIGRAM(S): 80 TABLET, FILM COATED ORAL at 22:18

## 2017-09-22 RX ADMIN — Medication 1 SPRAY(S): at 18:02

## 2017-09-22 RX ADMIN — Medication 1 SPRAY(S): at 05:21

## 2017-09-22 RX ADMIN — TIOTROPIUM BROMIDE 1 CAPSULE(S): 18 CAPSULE ORAL; RESPIRATORY (INHALATION) at 15:49

## 2017-09-22 RX ADMIN — BUDESONIDE AND FORMOTEROL FUMARATE DIHYDRATE 2 PUFF(S): 160; 4.5 AEROSOL RESPIRATORY (INHALATION) at 18:02

## 2017-09-22 RX ADMIN — Medication 25 MILLIGRAM(S): at 15:48

## 2017-09-22 RX ADMIN — TAMSULOSIN HYDROCHLORIDE 0.4 MILLIGRAM(S): 0.4 CAPSULE ORAL at 22:18

## 2017-09-22 RX ADMIN — RIVAROXABAN 10 MILLIGRAM(S): KIT at 15:48

## 2017-09-22 RX ADMIN — BUDESONIDE AND FORMOTEROL FUMARATE DIHYDRATE 2 PUFF(S): 160; 4.5 AEROSOL RESPIRATORY (INHALATION) at 05:21

## 2017-09-22 RX ADMIN — LOSARTAN POTASSIUM 50 MILLIGRAM(S): 100 TABLET, FILM COATED ORAL at 15:48

## 2017-09-22 RX ADMIN — FINASTERIDE 5 MILLIGRAM(S): 5 TABLET, FILM COATED ORAL at 15:48

## 2017-09-22 RX ADMIN — SENNA PLUS 2 TABLET(S): 8.6 TABLET ORAL at 23:04

## 2017-09-22 NOTE — PROGRESS NOTE ADULT - SUBJECTIVE AND OBJECTIVE BOX
Patient is a 89y old  Male who presents with a chief complaint of Left Hip Pain (19 Sep 2017 16:48)      HPI:  Patient with hip fracture treated with locking intramedullary jay jay. Patient is feeling well a lttile tired and now needs to get back on his feet Denies chest pain SoB, palpitations         MEDICATIONS  (STANDING):  rivaroxaban 10 milliGRAM(s) Oral daily  atorvastatin 10 milliGRAM(s) Oral at bedtime  finasteride 5 milliGRAM(s) Oral daily  tamsulosin 0.4 milliGRAM(s) Oral at bedtime  buDESOnide 160 MICROgram(s)/formoterol 4.5 MICROgram(s) Inhaler 2 Puff(s) Inhalation two times a day  tiotropium 18 MICROgram(s) Capsule 1 Capsule(s) Inhalation daily  fluticasone propionate 50 MICROgram(s)/spray Nasal Spray 1 Spray(s) Both Nostrils two times a day  losartan 50 milliGRAM(s) Oral daily  metoprolol succinate ER 25 milliGRAM(s) Oral daily  sodium chloride 0.9%. 1000 milliLiter(s) (50 mL/Hr) IV Continuous <Continuous>    MEDICATIONS  (PRN):  acetaminophen   Tablet 650 milliGRAM(s) Oral every 6 hours PRN For Temp greater than 38 C (100.4 F)  ondansetron Injectable 4 milliGRAM(s) IV Push every 6 hours PRN Nausea  oxyCODONE    IR 5 milliGRAM(s) Oral every 4 hours PRN Mild Pain (1 - 3)  oxyCODONE    IR 10 milliGRAM(s) Oral every 4 hours PRN Moderate Pain (4 - 6)  HYDROmorphone  Injectable 0.5 milliGRAM(s) IV Push every 4 hours PRN Severe Pain (7 - 10)  ALBUTerol    0.083% 2.5 milliGRAM(s) Nebulizer every 6 hours PRN Shortness of Breath and/or Wheezing  senna 2 Tablet(s) Oral at bedtime PRN Constipation  docusate sodium 100 milliGRAM(s) Oral daily PRN Constipation  benzonatate 100 milliGRAM(s) Oral every 8 hours PRN Cough      Allergies    No Known Allergies    Intolerances        REVIEW OF SYSTEM:  CONSTITUTIONAL: No fever, No change in weight, No fatigue  HEAD: No headache, No dizziness, No recent trauma  EYES: No eye pain, No visual disturbances, No discharge  ENT:  No difficulty hearing, No tinnitus, No vertigo, No sinus pain, No throat pain  NECK: No pain, No stiffness  BREASTS: No pain, No masses, No nipple discharge  RESPIRATORY: No cough, No wheezing, No chills, No hemoptysis, No shortness of breath at rest or exertional shortness of breath  CARDIOVASCULAR: No chest pain, No palpitations, No dizziness, No CHF, No arrhythmia, No cardiomegaly, No leg swelling  GASTROINTESTINAL: No abdominal, No epigastric pain. No nausea, No vomiting, No hematemesis, No diarrhea, No constipation. No melena, No hematochezia. No GERD  GENITOURINARY: No dysuria, No frequency, No hematuria, No incontinence, No nocturia, No hesitancy,  SKIN: No itching, No burning, No rashes, No lesions   LYMPH NODES: No history of enlarged glands  ENDOCRINE: No heat or cold intolerance, No hair loss. No osteoporosis, No thyroid disease  MUSCULOSKELETAL: No joint pain or swelling, No muscle, back, or extremity pain  PSYCHIATRIC: No depression, No anxiety, No mood swings, No difficulty sleeping  HEME/LYMPH: No easy bruising, No anticoagulants, No bleeding disorder, No bleeding gums  ALLERGY AND IMMUNOLOGIC: No hives, No eczema  NEUROLOGICAL: No memory loss, No loss of strength, No numbness, No tremors        VITALS:   T(C): 36.6 (09-23-17 @ 04:41), Max: 37.1 (09-22-17 @ 13:48)  HR: 84 (09-23-17 @ 04:41) (84 - 127)  BP: 109/65 (09-23-17 @ 04:41) (98/64 - 112/67)  RR: 18 (09-23-17 @ 04:41) (16 - 18)  SpO2: 95% (09-23-17 @ 04:41) (92% - 96%)  Wt(kg): --    09-21 @ 07:01  -  09-22 @ 07:00  --------------------------------------------------------  IN: 3030 mL / OUT: 2400 mL / NET: 630 mL    09-22 @ 07:01 - 09-23 @ 06:45  --------------------------------------------------------  IN: 1610 mL / OUT: 1100 mL / NET: 510 mL        PHYSICAL EXAM:  GENERAL: NAD, well nourished and conversant  HEAD:  Atraumatic  EYES: EOM, PERRLA, conjunctiva pink and sclera white  ENT: No tonsillar erythema, exudates, or enlargement, moist mucous membranes, good dentition, no lesions  NECK: Supple, No JVD, normal thyroid, carotids with normal upstrokes and no bruits  CHEST/LUNG: Clear to auscultation bilaterally, No rales, rhonchi, wheezing, or rubs  HEART: Regular rate and rhythm, No murmurs, rubs, or gallops  ABDOMEN: Soft, nondistended, no masses, guarding, tenderness or rebound, bowel sounds present  EXTREMITIES:  2+ Peripheral Pulses, No clubbing, cyanosis, or edema. No arthritis of shoulders, elbows, hands, hips, knees, ankles, or feet. No DJD C spine, T spine, or L/S spine  LYMPH: No lymphadenopathy noted  SKIN: No rashes or lesions  NERVOUS SYSTEM:  Alert & Oriented X3, normal cognitive function. Motor Strength 5/5 right upper and right lower.  5/5 left upper and left lower extremities, DTRs 2+ intact and symmetric    LABS:                          9.2    8.0   )-----------( 294      ( 22 Sep 2017 16:39 )             27.6     09-22    130<L>  |  95<L>  |  16  ----------------------------<  106<H>  4.4   |  21<L>  |  0.86  09-21    134<L>  |  97  |  24<H>  ----------------------------<  130<H>  5.8<H>   |  22  |  1.18  09-20    128<L>  |  95<L>  |  21  ----------------------------<  184<H>  5.0   |  21<L>  |  1.05    Ca    7.9<L>      22 Sep 2017 06:01  Ca    8.5      21 Sep 2017 06:16  Ca    8.2<L>      20 Sep 2017 19:47  Phos  3.6     09-20  Mg     1.7     09-20      CAPILLARY BLOOD GLUCOSE          RADIOLOGY & ADDITIONAL TESTS:      Consultant(s):    Care Discussed with Consultants/Other Providers [ ] YES  [ ] NO

## 2017-09-22 NOTE — PROGRESS NOTE ADULT - SUBJECTIVE AND OBJECTIVE BOX
Patient is a 89y old  Male who presents with a chief complaint of Left Hip Pain (19 Sep 2017 16:48)    He denies c/o chest pain or palptiations. Mild SOB lying flat.     Allergies    No Known Allergies    MEDICATIONS  (STANDING):  rivaroxaban 10 milliGRAM(s) Oral daily  atorvastatin 10 milliGRAM(s) Oral at bedtime  finasteride 5 milliGRAM(s) Oral daily  tamsulosin 0.4 milliGRAM(s) Oral at bedtime  sodium chloride 0.9%. 1000 milliLiter(s) (75 mL/Hr) IV Continuous <Continuous>  buDESOnide 160 MICROgram(s)/formoterol 4.5 MICROgram(s) Inhaler 2 Puff(s) Inhalation two times a day  tiotropium 18 MICROgram(s) Capsule 1 Capsule(s) Inhalation daily  fluticasone propionate 50 MICROgram(s)/spray Nasal Spray 1 Spray(s) Both Nostrils two times a day    MEDICATIONS  (PRN):  acetaminophen   Tablet 650 milliGRAM(s) Oral every 6 hours PRN For Temp greater than 38 C (100.4 F)  ondansetron Injectable 4 milliGRAM(s) IV Push every 6 hours PRN Nausea  oxyCODONE    IR 5 milliGRAM(s) Oral every 4 hours PRN Mild Pain (1 - 3)  oxyCODONE    IR 10 milliGRAM(s) Oral every 4 hours PRN Moderate Pain (4 - 6)  HYDROmorphone  Injectable 0.5 milliGRAM(s) IV Push every 4 hours PRN Severe Pain (7 - 10)  ALBUTerol    0.083% 2.5 milliGRAM(s) Nebulizer every 6 hours PRN Shortness of Breath and/or Wheezing      PHYSICAL EXAM:  Vital Signs Last 24 Hrs  T(C): 36.5 (22 Sep 2017 05:24), Max: 37.1 (21 Sep 2017 20:08)  T(F): 97.7 (22 Sep 2017 05:24), Max: 98.7 (21 Sep 2017 20:08)  HR: 90 (22 Sep 2017 05:24) (86 - 107)  BP: 148/72 (22 Sep 2017 05:24) (104/59 - 148/72)  BP(mean): --  RR: 18 (22 Sep 2017 05:24) (17 - 20)  SpO2: 96% (22 Sep 2017 05:24) (93% - 100%)  Daily     Daily   I&O's Summary    21 Sep 2017 07:01  -  22 Sep 2017 07:00  --------------------------------------------------------  IN: 2870 mL / OUT: 2400 mL / NET: 470 mL    General Appearance: 	 Alert, cooperative  HEENT: normocephalic, atraumatic  Neck: 3cm JVD,  carotid 2+  bilaterally without bruits  Lungs:  clear to auscultation and percussion bilaterally anteriorly  Cor:  pmi 5th ICS MCL, regular rate and rhythm, S1 normal intensity, S2 normal intensity, no gallops, Grader I/VI apical systolic murmur  Abdomen: soft, non-tender; bowel sounds normal; no masses,  no organomegaly  Extremities: without cyanosis, clubbing or edema  Vasc: 2-+ PT and DP pulses; LE varicosities    Labs:  CBC Full  -  ( 22 Sep 2017 06:01 )  WBC Count : 7.6 K/uL  Hemoglobin : 8.6 g/dL  Hematocrit : 25.5 %  Platelet Count - Automated : 249 K/uL  Mean Cell Volume : 86.1 fl  Mean Cell Hemoglobin : 29.0 pg  Mean Cell Hemoglobin Concentration : 33.7 gm/dL  Auto Neutrophil # : x  Auto Lymphocyte # : x  Auto Monocyte # : x  Auto Eosinophil # : x  Auto Basophil # : x  Auto Neutrophil % : x  Auto Lymphocyte % : x  Auto Monocyte % : x  Auto Eosinophil % : x  Auto Basophil % : x    09-22    130<L>  |  95<L>  |  16  ----------------------------<  106<H>  4.4   |  21<L>  |  0.86    Ca    7.9<L>      22 Sep 2017 06:01  Phos  3.6     09-20  Mg     1.7     09-20          PT/INR - ( 20 Sep 2017 07:11 )   PT: 12.2 sec;   INR: 1.12 ratio         PTT - ( 20 Sep 2017 07:11 )  PTT:30.0 sec                  Carlo Mcconnell MD [facility]  [phone]

## 2017-09-22 NOTE — PROGRESS NOTE ADULT - ASSESSMENT
89y Male with history of CAD S/P AWMI remote with LVEF 20-25%, chronic systolic CHF, AICD, essential hypertension, hyperlipidemia and reactive airways admitted after a mechanical fall with left hip fracture. He needs to resume his cardiac medications with LVEF 20-25%. Would as well D/C IVF.

## 2017-09-22 NOTE — PROGRESS NOTE ADULT - ATTENDING COMMENTS
as above- pulm. stable  pain control  early ambulation and rehab.    Normna Coughlin MD-Pulmonary   [phone] as above- pulm. stable  pain control  increase ambulation and rehab. pending-(pulm. stable for rehab)    Norman Coughlin MD-Pulmonary   [phone]

## 2017-09-22 NOTE — PROGRESS NOTE ADULT - SUBJECTIVE AND OBJECTIVE BOX
CHIEF COMPLAINT:    Interval Events:    REVIEW OF SYSTEMS:  Constitutional: No fevers or chills. No weight loss. No fatigue or generalized malaise.  Eyes: No itching or discharge from the eyes  ENT: No ear pain. No ear discharge. No nasal congestion. No post nasal drip. No epistaxis. No throat pain. No sore throat. No difficulty swallowing.   CV: No chest pain. No palpitations. No lightheadedness or dizziness.   Resp: No dyspnea at rest. No dyspnea on exertion. No orthopnea. No wheezing. No cough. No stridor. No sputum production. No chest pain with respiration.  GI: No nausea. No vomiting. No diarrhea.  MSK: No joint pain or pain in any extremities  Integumentary: No skin lesions. No pedal edema.  Neurological: No gross motor weakness. No sensory changes.  [ ] All other systems negative  [ ] Unable to assess ROS because ________    OBJECTIVE:  ICU Vital Signs Last 24 Hrs  T(C): 37.1 (22 Sep 2017 00:13), Max: 37.1 (21 Sep 2017 20:08)  T(F): 98.7 (22 Sep 2017 00:13), Max: 98.7 (21 Sep 2017 20:08)  HR: 91 (22 Sep 2017 00:13) (86 - 107)  BP: 125/70 (22 Sep 2017 00:13) (104/59 - 135/69)  BP(mean): --  ABP: --  ABP(mean): --  RR: 18 (22 Sep 2017 00:13) (17 - 20)  SpO2: 93% (22 Sep 2017 00:13) (91% - 100%)        09-20 @ 07:01 - 09-21 @ 07:00  --------------------------------------------------------  IN: 2510 mL / OUT: 690 mL / NET: 1820 mL    09-21 @ 07:01 - 09-22 @ 04:58  --------------------------------------------------------  IN: 1610 mL / OUT: 1850 mL / NET: -240 mL      CAPILLARY BLOOD GLUCOSE          PHYSICAL EXAM:  General: Awake, alert, oriented X 3.   HEENT: Atraumatic, normocephalic.                 Mallampatti Grade                 No nasal congestion.                No tonsillar or pharyngeal exudates.  Lymph Nodes: No palpable lymphadenopathy  Neck: No JVD. No carotid bruit.   Respiratory: Normal chest expansion                         Normal percussion                         Normal and equal air entry                         No wheeze, rhonchi or rales.  Cardiovascular: S1 S2 normal. No murmurs, rubs or gallops.   Abdomen: Soft, non-tender, non-distended. No organomegaly.  Extremities: Warm to touch. Peripheral pulse palpable. No pedal edema.   Skin: No rashes or skin lesions  Neurological: Motor and sensory examination equal and normal in all four extremities.  Psychiatry: Appropriate mood and affect.    HOSPITAL MEDICATIONS:  MEDICATIONS  (STANDING):  rivaroxaban 10 milliGRAM(s) Oral daily  atorvastatin 10 milliGRAM(s) Oral at bedtime  finasteride 5 milliGRAM(s) Oral daily  tamsulosin 0.4 milliGRAM(s) Oral at bedtime  sodium chloride 0.9%. 1000 milliLiter(s) (75 mL/Hr) IV Continuous <Continuous>  buDESOnide 160 MICROgram(s)/formoterol 4.5 MICROgram(s) Inhaler 2 Puff(s) Inhalation two times a day  tiotropium 18 MICROgram(s) Capsule 1 Capsule(s) Inhalation daily  fluticasone propionate 50 MICROgram(s)/spray Nasal Spray 1 Spray(s) Both Nostrils two times a day    MEDICATIONS  (PRN):  acetaminophen   Tablet 650 milliGRAM(s) Oral every 6 hours PRN For Temp greater than 38 C (100.4 F)  ondansetron Injectable 4 milliGRAM(s) IV Push every 6 hours PRN Nausea  oxyCODONE    IR 5 milliGRAM(s) Oral every 4 hours PRN Mild Pain (1 - 3)  oxyCODONE    IR 10 milliGRAM(s) Oral every 4 hours PRN Moderate Pain (4 - 6)  HYDROmorphone  Injectable 0.5 milliGRAM(s) IV Push every 4 hours PRN Severe Pain (7 - 10)  ALBUTerol    0.083% 2.5 milliGRAM(s) Nebulizer every 6 hours PRN Shortness of Breath and/or Wheezing      LABS:                        8.4    9.2   )-----------( 241      ( 21 Sep 2017 06:16 )             24.5     09-21    134<L>  |  97  |  24<H>  ----------------------------<  130<H>  5.8<H>   |  22  |  1.18    Ca    8.5      21 Sep 2017 06:16  Phos  3.6     09-20  Mg     1.7     09-20      PT/INR - ( 20 Sep 2017 07:11 )   PT: 12.2 sec;   INR: 1.12 ratio         PTT - ( 20 Sep 2017 07:11 )  PTT:30.0 sec      Venous Blood Gas:  09-20 @ 19:44  7.35/41/20/22/23  VBG Lactate: 2.2      MICROBIOLOGY:     RADIOLOGY:  [ ] Reviewed and interpreted by me    Point of Care Ultrasound Findings:    PFT:    EKG: CHIEF COMPLAINT:no pain; no sob-minimal cough    Interval Events:ambulated    REVIEW OF SYSTEMS:  Constitutional: No fevers or chills. No weight loss. No fatigue or generalized malaise.  Eyes: No itching or discharge from the eyes  ENT: No ear pain. No ear discharge. No nasal congestion. No post nasal drip. No epistaxis. No throat pain. No sore throat. No difficulty swallowing.   CV: No chest pain. No palpitations. No lightheadedness or dizziness.   Resp: No dyspnea at rest. No dyspnea on exertion. No orthopnea. No wheezing. + cough. No stridor. No sputum production. No chest pain with respiration.  GI: No nausea. No vomiting. No diarrhea.  MSK: No joint pain or pain in any extremities  Integumentary: No skin lesions. No pedal edema.  Neurological: No gross motor weakness. No sensory changes.  [ +] All other systems negative  [ ] Unable to assess ROS because ________    OBJECTIVE:  ICU Vital Signs Last 24 Hrs  T(C): 37.1 (22 Sep 2017 00:13), Max: 37.1 (21 Sep 2017 20:08)  T(F): 98.7 (22 Sep 2017 00:13), Max: 98.7 (21 Sep 2017 20:08)  HR: 91 (22 Sep 2017 00:13) (86 - 107)  BP: 125/70 (22 Sep 2017 00:13) (104/59 - 135/69)  BP(mean): --  ABP: --  ABP(mean): --  RR: 18 (22 Sep 2017 00:13) (17 - 20)  SpO2: 93% (22 Sep 2017 00:13) (91% - 100%)        09-20 @ 07:01  -  09-21 @ 07:00  --------------------------------------------------------  IN: 2510 mL / OUT: 690 mL / NET: 1820 mL    09-21 @ 07:01 - 09-22 @ 04:58  --------------------------------------------------------  IN: 1610 mL / OUT: 1850 mL / NET: -240 mL      CAPILLARY BLOOD GLUCOSE          PHYSICAL EXAM:NAD in bed  General: Awake, alert, oriented X 3.   HEENT: Atraumatic, normocephalic.                 Mallampatti Grade 2                No nasal congestion.                No tonsillar or pharyngeal exudates.  Lymph Nodes: No palpable lymphadenopathy  Neck: No JVD. No carotid bruit.   Respiratory: Normal chest expansion                         Normal percussion                         Normal and equal air entry                         No wheeze, rhonchi or rales.  Cardiovascular: S1 S2 normal. 2+ murmurs,no rubs or gallops.   Abdomen: Soft, non-tender, non-distended. No organomegaly.  Extremities: Warm to touch. Peripheral pulse palpable. No pedal edema.   Skin: No rashes or skin lesions  Neurological: Motor and sensory examination equal and normal in all four extremities.  Psychiatry: Appropriate mood and affect.    HOSPITAL MEDICATIONS:  MEDICATIONS  (STANDING):  rivaroxaban 10 milliGRAM(s) Oral daily  atorvastatin 10 milliGRAM(s) Oral at bedtime  finasteride 5 milliGRAM(s) Oral daily  tamsulosin 0.4 milliGRAM(s) Oral at bedtime  sodium chloride 0.9%. 1000 milliLiter(s) (75 mL/Hr) IV Continuous <Continuous>  buDESOnide 160 MICROgram(s)/formoterol 4.5 MICROgram(s) Inhaler 2 Puff(s) Inhalation two times a day  tiotropium 18 MICROgram(s) Capsule 1 Capsule(s) Inhalation daily  fluticasone propionate 50 MICROgram(s)/spray Nasal Spray 1 Spray(s) Both Nostrils two times a day    MEDICATIONS  (PRN):  acetaminophen   Tablet 650 milliGRAM(s) Oral every 6 hours PRN For Temp greater than 38 C (100.4 F)  ondansetron Injectable 4 milliGRAM(s) IV Push every 6 hours PRN Nausea  oxyCODONE    IR 5 milliGRAM(s) Oral every 4 hours PRN Mild Pain (1 - 3)  oxyCODONE    IR 10 milliGRAM(s) Oral every 4 hours PRN Moderate Pain (4 - 6)  HYDROmorphone  Injectable 0.5 milliGRAM(s) IV Push every 4 hours PRN Severe Pain (7 - 10)  ALBUTerol    0.083% 2.5 milliGRAM(s) Nebulizer every 6 hours PRN Shortness of Breath and/or Wheezing      LABS:                        8.4    9.2   )-----------( 241      ( 21 Sep 2017 06:16 )             24.5     09-21    134<L>  |  97  |  24<H>  ----------------------------<  130<H>  5.8<H>   |  22  |  1.18    Ca    8.5      21 Sep 2017 06:16  Phos  3.6     09-20  Mg     1.7     09-20      PT/INR - ( 20 Sep 2017 07:11 )   PT: 12.2 sec;   INR: 1.12 ratio         PTT - ( 20 Sep 2017 07:11 )  PTT:30.0 sec      Venous Blood Gas:  09-20 @ 19:44  7.35/41/20/22/23  VBG Lactate: 2.2      MICROBIOLOGY:     RADIOLOGY:  [ ] Reviewed and interpreted by me    Point of Care Ultrasound Findings:    PFT:    EKG:

## 2017-09-22 NOTE — PROGRESS NOTE ADULT - SUBJECTIVE AND OBJECTIVE BOX
Orthopedic Surgery Progress Note  No acute events overnight.  Pain well controlled.  No chest pain, shortness of breath, light-headedness. 1 u PRBC 9/21    O:  Vital Signs Last 24 Hrs  T(C): 36.5 (22 Sep 2017 05:24), Max: 37.1 (21 Sep 2017 20:08)  T(F): 97.7 (22 Sep 2017 05:24), Max: 98.7 (21 Sep 2017 20:08)  HR: 90 (22 Sep 2017 05:24) (86 - 107)  BP: 148/72 (22 Sep 2017 05:24) (104/59 - 148/72)  BP(mean): --  RR: 18 (22 Sep 2017 05:24) (17 - 20)  SpO2: 96% (22 Sep 2017 05:24) (93% - 100%)    Gen: NAD  RLE/LLE  Dressing C/D/I  EHL/FHL/TA/GS intact  SILT DP/SP/MANCIA/Sa  WWP distally    Labs:                        8.6    7.6   )-----------( 249      ( 22 Sep 2017 06:01 )             25.5                         8.4    9.2   )-----------( 241      ( 21 Sep 2017 06:16 )             24.5       09-21    134<L>  |  97  |  24<H>  ----------------------------<  130<H>  5.8<H>   |  22  |  1.18        PT/INR - ( 20 Sep 2017 07:11 )   PT: 12.2 sec;   INR: 1.12 ratio         PTT - ( 20 Sep 2017 07:11 )  PTT:30.0 sec    A/P 89y year old male s/p Insertion of locking intramedullary jay jay into hip    Pain Control  DVT PPX  PT/OOB  WBAT   Dispo Planning  AM Labs  Chloe ARGUETA [phone]

## 2017-09-22 NOTE — PROGRESS NOTE ADULT - ASSESSMENT
89 yo M with a h/o asthma, seasonal allergies, CAD, systolic CHF s/p AICD, HTN admitted s/p fall on to left hip with subsequent left hip fracture. Plan for OR for ORIF today.  Asthma history - states he is well controlled, using an Albuterol nebulizer 1-2x a day as needed.  Had an episode of acute bronchitis the end of August and is s/p course of antibiotics and Prednisone.  Currently denies SOB, wheezing or cough.  Not hypoxic, comfortable appearing and speaking in full sentences.  Left hip pain currently controlled     Asthma - mild well controlled      Hyponatremia - unclear etiology, appears euvolemic. Would send urine electrolytes and close monitoring.

## 2017-09-23 LAB
ANION GAP SERPL CALC-SCNC: 10 MMOL/L — SIGNIFICANT CHANGE UP (ref 5–17)
BUN SERPL-MCNC: 16 MG/DL — SIGNIFICANT CHANGE UP (ref 7–23)
CALCIUM SERPL-MCNC: 8.3 MG/DL — LOW (ref 8.4–10.5)
CHLORIDE SERPL-SCNC: 96 MMOL/L — SIGNIFICANT CHANGE UP (ref 96–108)
CO2 SERPL-SCNC: 20 MMOL/L — LOW (ref 22–31)
CREAT SERPL-MCNC: 1.02 MG/DL — SIGNIFICANT CHANGE UP (ref 0.5–1.3)
GLUCOSE SERPL-MCNC: 101 MG/DL — HIGH (ref 70–99)
HCT VFR BLD CALC: 26.3 % — LOW (ref 39–50)
HGB BLD-MCNC: 8.7 G/DL — LOW (ref 13–17)
MCHC RBC-ENTMCNC: 27.2 PG — SIGNIFICANT CHANGE UP (ref 27–34)
MCHC RBC-ENTMCNC: 33.1 GM/DL — SIGNIFICANT CHANGE UP (ref 32–36)
MCV RBC AUTO: 82.2 FL — SIGNIFICANT CHANGE UP (ref 80–100)
PLATELET # BLD AUTO: 288 K/UL — SIGNIFICANT CHANGE UP (ref 150–400)
POTASSIUM SERPL-MCNC: 5.1 MMOL/L — SIGNIFICANT CHANGE UP (ref 3.5–5.3)
POTASSIUM SERPL-SCNC: 5.1 MMOL/L — SIGNIFICANT CHANGE UP (ref 3.5–5.3)
RBC # BLD: 3.2 M/UL — LOW (ref 4.2–5.8)
RBC # FLD: 16.7 % — HIGH (ref 10.3–14.5)
SODIUM SERPL-SCNC: 126 MMOL/L — LOW (ref 135–145)
WBC # BLD: 8.68 K/UL — SIGNIFICANT CHANGE UP (ref 3.8–10.5)
WBC # FLD AUTO: 8.68 K/UL — SIGNIFICANT CHANGE UP (ref 3.8–10.5)

## 2017-09-23 RX ADMIN — Medication 100 MILLIGRAM(S): at 05:41

## 2017-09-23 RX ADMIN — TIOTROPIUM BROMIDE 1 CAPSULE(S): 18 CAPSULE ORAL; RESPIRATORY (INHALATION) at 12:51

## 2017-09-23 RX ADMIN — FINASTERIDE 5 MILLIGRAM(S): 5 TABLET, FILM COATED ORAL at 12:51

## 2017-09-23 RX ADMIN — BUDESONIDE AND FORMOTEROL FUMARATE DIHYDRATE 2 PUFF(S): 160; 4.5 AEROSOL RESPIRATORY (INHALATION) at 18:13

## 2017-09-23 RX ADMIN — Medication 1 SPRAY(S): at 05:41

## 2017-09-23 RX ADMIN — LOSARTAN POTASSIUM 50 MILLIGRAM(S): 100 TABLET, FILM COATED ORAL at 05:41

## 2017-09-23 RX ADMIN — Medication 100 MILLIGRAM(S): at 04:21

## 2017-09-23 RX ADMIN — Medication 1 SPRAY(S): at 18:13

## 2017-09-23 RX ADMIN — BUDESONIDE AND FORMOTEROL FUMARATE DIHYDRATE 2 PUFF(S): 160; 4.5 AEROSOL RESPIRATORY (INHALATION) at 05:41

## 2017-09-23 RX ADMIN — ATORVASTATIN CALCIUM 10 MILLIGRAM(S): 80 TABLET, FILM COATED ORAL at 21:31

## 2017-09-23 RX ADMIN — RIVAROXABAN 10 MILLIGRAM(S): KIT at 12:51

## 2017-09-23 RX ADMIN — TAMSULOSIN HYDROCHLORIDE 0.4 MILLIGRAM(S): 0.4 CAPSULE ORAL at 21:32

## 2017-09-23 RX ADMIN — Medication 25 MILLIGRAM(S): at 05:41

## 2017-09-23 NOTE — PROGRESS NOTE ADULT - ASSESSMENT
89y Male with history of CAD S/P AWMI remote with LVEF 20-25%, chronic systolic CHF, AICD, essential hypertension, hyperlipidemia and reactive airways admitted after a mechanical fall with left hip fracture. He is doing well. No CHF on exam. Meds resumed yesterday and off IVF. Hct is improved.

## 2017-09-23 NOTE — PROGRESS NOTE ADULT - ASSESSMENT
Impression: Stable       Plan: Continue present treatment                 Out of bed, ambulate, weight bearing as tolerated                  Physical therapy follow up                  Continue to monitor    Landon Lucas PA-C  Orthopaedic Surgery  Team pager [phone]  [phone]

## 2017-09-23 NOTE — PROGRESS NOTE ADULT - SUBJECTIVE AND OBJECTIVE BOX
Patient is a 89y old  Male who presents with a chief complaint of Left Hip Pain (19 Sep 2017 16:48)      HPI:  Patient with hip fracture treated with locking intramedullary jay jay. Patient is feeling well a lttile tired and now needs to get back on his feet Denies chest pain SoB, palpitations         MEDICATIONS  (STANDING):  rivaroxaban 10 milliGRAM(s) Oral daily  atorvastatin 10 milliGRAM(s) Oral at bedtime  finasteride 5 milliGRAM(s) Oral daily  tamsulosin 0.4 milliGRAM(s) Oral at bedtime  buDESOnide 160 MICROgram(s)/formoterol 4.5 MICROgram(s) Inhaler 2 Puff(s) Inhalation two times a day  tiotropium 18 MICROgram(s) Capsule 1 Capsule(s) Inhalation daily  fluticasone propionate 50 MICROgram(s)/spray Nasal Spray 1 Spray(s) Both Nostrils two times a day  losartan 50 milliGRAM(s) Oral daily  metoprolol succinate ER 25 milliGRAM(s) Oral daily  sodium chloride 0.9%. 1000 milliLiter(s) (50 mL/Hr) IV Continuous <Continuous>    MEDICATIONS  (PRN):  acetaminophen   Tablet 650 milliGRAM(s) Oral every 6 hours PRN For Temp greater than 38 C (100.4 F)  ondansetron Injectable 4 milliGRAM(s) IV Push every 6 hours PRN Nausea  oxyCODONE    IR 5 milliGRAM(s) Oral every 4 hours PRN Mild Pain (1 - 3)  oxyCODONE    IR 10 milliGRAM(s) Oral every 4 hours PRN Moderate Pain (4 - 6)  HYDROmorphone  Injectable 0.5 milliGRAM(s) IV Push every 4 hours PRN Severe Pain (7 - 10)  ALBUTerol    0.083% 2.5 milliGRAM(s) Nebulizer every 6 hours PRN Shortness of Breath and/or Wheezing  senna 2 Tablet(s) Oral at bedtime PRN Constipation  docusate sodium 100 milliGRAM(s) Oral daily PRN Constipation  benzonatate 100 milliGRAM(s) Oral every 8 hours PRN Cough      Allergies    No Known Allergies    Intolerances        REVIEW OF SYSTEM:  CONSTITUTIONAL: No fever, No change in weight, No fatigue  HEAD: No headache, No dizziness, No recent trauma  EYES: No eye pain, No visual disturbances, No discharge  ENT:  No difficulty hearing, No tinnitus, No vertigo, No sinus pain, No throat pain  NECK: No pain, No stiffness  BREASTS: No pain, No masses, No nipple discharge  RESPIRATORY: No cough, No wheezing, No chills, No hemoptysis, No shortness of breath at rest or exertional shortness of breath  CARDIOVASCULAR: No chest pain, No palpitations, No dizziness, No CHF, No arrhythmia, No cardiomegaly, No leg swelling  GASTROINTESTINAL: No abdominal, No epigastric pain. No nausea, No vomiting, No hematemesis, No diarrhea, No constipation. No melena, No hematochezia. No GERD  GENITOURINARY: No dysuria, No frequency, No hematuria, No incontinence, No nocturia, No hesitancy,  SKIN: No itching, No burning, No rashes, No lesions   LYMPH NODES: No history of enlarged glands  ENDOCRINE: No heat or cold intolerance, No hair loss. No osteoporosis, No thyroid disease  MUSCULOSKELETAL: No joint pain or swelling, No muscle, back, or extremity pain  PSYCHIATRIC: No depression, No anxiety, No mood swings, No difficulty sleeping  HEME/LYMPH: No easy bruising, No anticoagulants, No bleeding disorder, No bleeding gums  ALLERGY AND IMMUNOLOGIC: No hives, No eczema  NEUROLOGICAL: No memory loss, No loss of strength, No numbness, No tremors        VITALS:   T(C): 36.6 (09-23-17 @ 04:41), Max: 37.1 (09-22-17 @ 13:48)  HR: 84 (09-23-17 @ 04:41) (84 - 127)  BP: 109/65 (09-23-17 @ 04:41) (98/64 - 112/67)  RR: 18 (09-23-17 @ 04:41) (16 - 18)  SpO2: 95% (09-23-17 @ 04:41) (92% - 96%)  Wt(kg): --    09-21 @ 07:01  -  09-22 @ 07:00  --------------------------------------------------------  IN: 3030 mL / OUT: 2400 mL / NET: 630 mL    09-22 @ 07:01 - 09-23 @ 06:45  --------------------------------------------------------  IN: 1610 mL / OUT: 1100 mL / NET: 510 mL        PHYSICAL EXAM:  GENERAL: NAD, well nourished and conversant  HEAD:  Atraumatic  EYES: EOM, PERRLA, conjunctiva pink and sclera white  ENT: No tonsillar erythema, exudates, or enlargement, moist mucous membranes, good dentition, no lesions  NECK: Supple, No JVD, normal thyroid, carotids with normal upstrokes and no bruits  CHEST/LUNG: Clear to auscultation bilaterally, No rales, rhonchi, wheezing, or rubs  HEART: Regular rate and rhythm, No murmurs, rubs, or gallops  ABDOMEN: Soft, nondistended, no masses, guarding, tenderness or rebound, bowel sounds present  EXTREMITIES:  2+ Peripheral Pulses, No clubbing, cyanosis, or edema. No arthritis of shoulders, elbows, hands, hips, knees, ankles, or feet. No DJD C spine, T spine, or L/S spine  LYMPH: No lymphadenopathy noted  SKIN: No rashes or lesions  NERVOUS SYSTEM:  Alert & Oriented X3, normal cognitive function. Motor Strength 5/5 right upper and right lower.  5/5 left upper and left lower extremities, DTRs 2+ intact and symmetric    LABS:                          9.2    8.0   )-----------( 294      ( 22 Sep 2017 16:39 )             27.6     09-22    130<L>  |  95<L>  |  16  ----------------------------<  106<H>  4.4   |  21<L>  |  0.86  09-21    134<L>  |  97  |  24<H>  ----------------------------<  130<H>  5.8<H>   |  22  |  1.18  09-20    128<L>  |  95<L>  |  21  ----------------------------<  184<H>  5.0   |  21<L>  |  1.05    Ca    7.9<L>      22 Sep 2017 06:01  Ca    8.5      21 Sep 2017 06:16  Ca    8.2<L>      20 Sep 2017 19:47  Phos  3.6     09-20  Mg     1.7     09-20      CAPILLARY BLOOD GLUCOSE          RADIOLOGY & ADDITIONAL TESTS:      Consultant(s):    Care Discussed with Consultants/Other Providers [ ] YES  [ ] NO Patient is a 89y old  Male who presents with a chief complaint of Left Hip Pain (19 Sep 2017 16:48)    HPI:  Patient with hip fracture treated with locking intramedullary jay jay. Patient is feeling well a lttile tired and now needs to get back on his feet Denies chest pain SoB, palpitations. Recovery and ambulation compromised by her advanced age.  On postoperative xarelto for hip fracture, plus chronic atrial fibrillation.         MEDICATIONS  (STANDING):  rivaroxaban 10 milliGRAM(s) Oral daily  atorvastatin 10 milliGRAM(s) Oral at bedtime  finasteride 5 milliGRAM(s) Oral daily  tamsulosin 0.4 milliGRAM(s) Oral at bedtime  buDESOnide 160 MICROgram(s)/formoterol 4.5 MICROgram(s) Inhaler 2 Puff(s) Inhalation two times a day  tiotropium 18 MICROgram(s) Capsule 1 Capsule(s) Inhalation daily  fluticasone propionate 50 MICROgram(s)/spray Nasal Spray 1 Spray(s) Both Nostrils two times a day  losartan 50 milliGRAM(s) Oral daily  metoprolol succinate ER 25 milliGRAM(s) Oral daily  sodium chloride 0.9%. 1000 milliLiter(s) (50 mL/Hr) IV Continuous <Continuous>    MEDICATIONS  (PRN):  acetaminophen   Tablet 650 milliGRAM(s) Oral every 6 hours PRN For Temp greater than 38 C (100.4 F)  ondansetron Injectable 4 milliGRAM(s) IV Push every 6 hours PRN Nausea  oxyCODONE    IR 5 milliGRAM(s) Oral every 4 hours PRN Mild Pain (1 - 3)  oxyCODONE    IR 10 milliGRAM(s) Oral every 4 hours PRN Moderate Pain (4 - 6)  HYDROmorphone  Injectable 0.5 milliGRAM(s) IV Push every 4 hours PRN Severe Pain (7 - 10)  ALBUTerol    0.083% 2.5 milliGRAM(s) Nebulizer every 6 hours PRN Shortness of Breath and/or Wheezing  senna 2 Tablet(s) Oral at bedtime PRN Constipation  docusate sodium 100 milliGRAM(s) Oral daily PRN Constipation  benzonatate 100 milliGRAM(s) Oral every 8 hours PRN Cough      Allergies    No Known Allergies    Intolerances        REVIEW OF SYSTEM:  CONSTITUTIONAL: No fever, No change in weight, No fatigue  HEAD: No headache, No dizziness, No recent trauma  EYES: No eye pain, No visual disturbances, No discharge  ENT:  No difficulty hearing, No tinnitus, No vertigo, No sinus pain, No throat pain  NECK: No pain, No stiffness  BREASTS: No pain, No masses, No nipple discharge  RESPIRATORY: No cough, No wheezing, No chills, No hemoptysis, No shortness of breath at rest or exertional shortness of breath  CARDIOVASCULAR: No chest pain, No palpitations, No dizziness, No CHF, No arrhythmia, No cardiomegaly, No leg swelling  GASTROINTESTINAL: No abdominal, No epigastric pain. No nausea, No vomiting, No hematemesis, No diarrhea, No constipation. No melena, No hematochezia. No GERD  GENITOURINARY: No dysuria, No frequency, No hematuria, No incontinence, No nocturia, No hesitancy,  SKIN: No itching, No burning, No rashes, No lesions   LYMPH NODES: No history of enlarged glands  ENDOCRINE: No heat or cold intolerance, No hair loss. No osteoporosis, No thyroid disease  MUSCULOSKELETAL: left hip pain, status post fall and hip fracture  PSYCHIATRIC: No depression, No anxiety, No mood swings, No difficulty sleeping  HEME/LYMPH: No easy bruising, No anticoagulants, No bleeding disorder, No bleeding gums  ALLERGY AND IMMUNOLOGIC: No hives, No eczema  NEUROLOGICAL: No memory loss, No loss of strength, No numbness, No tremors        VITALS:   T(C): 36.6 (09-23-17 @ 04:41), Max: 37.1 (09-22-17 @ 13:48)  HR: 84 (09-23-17 @ 04:41) (84 - 127)  BP: 109/65 (09-23-17 @ 04:41) (98/64 - 112/67)  RR: 18 (09-23-17 @ 04:41) (16 - 18)  SpO2: 95% (09-23-17 @ 04:41) (92% - 96%)  Wt(kg): --    09-21 @ 07:01  -  09-22 @ 07:00  --------------------------------------------------------  IN: 3030 mL / OUT: 2400 mL / NET: 630 mL    09-22 @ 07:01  -  09-23 @ 06:45  --------------------------------------------------------  IN: 1610 mL / OUT: 1100 mL / NET: 510 mL        PHYSICAL EXAM:  GENERAL: NAD, well nourished and conversant  HEAD:  Atraumatic  EYES: EOM, PERRLA, conjunctiva pink and sclera white  ENT: No tonsillar erythema, exudates, or enlargement, moist mucous membranes, good dentition, no lesions  NECK: Supple, No JVD, normal thyroid, carotids with normal upstrokes and no bruits  CHEST/LUNG: Clear to auscultation bilaterally, No rales, rhonchi, wheezing, or rubs  HEART: Regular rate and rhythm, No murmurs, rubs, or gallops  ABDOMEN: Soft, nondistended, no masses, guarding, tenderness or rebound, bowel sounds present  EXTREMITIES:  2+ Peripheral Pulses, No clubbing, cyanosis, or edema. No arthritis of shoulders, elbows, hands, hips, knees, ankles, or feet. No DJD C spine, T spine, or L/S spine  LYMPH: No lymphadenopathy noted  SKIN: No rashes or lesions  NERVOUS SYSTEM:  Alert & Oriented X3, normal cognitive function. Motor Strength 5/5 right upper and right lower.  5/5 left upper and left lower extremities, DTRs 2+ intact and symmetric    LABS:    CBC Full  -  ( 23 Sep 2017 08:09 )  WBC Count : 8.68 K/uL  Hemoglobin : 8.7 g/dL  Hematocrit : 26.3 %  Platelet Count - Automated : 288 K/uL  Mean Cell Volume : 82.2 fl  Mean Cell Hemoglobin : 27.2 pg  Mean Cell Hemoglobin Concentration : 33.1 gm/dL  Auto Neutrophil # : x  Auto Lymphocyte # : x  Auto Monocyte # : x  Auto Eosinophil # : x  Auto Basophil # : x  Auto Neutrophil % : x  Auto Lymphocyte % : x  Auto Monocyte % : x  Auto Eosinophil % : x  Auto Basophil % : x    09-23    126<L>  |  96  |  16  ----------------------------<  101<H>  5.1   |  20<L>  |  1.02    Ca    8.3<L>      23 Sep 2017 08:32    chronic hyponatremia persists.            RADIOLOGY & ADDITIONAL TESTS:      Consultant(s):    Care Discussed with Consultants/Other Providers [ ] YES  [ ] NO

## 2017-09-23 NOTE — PROGRESS NOTE ADULT - ASSESSMENT
89 yo male s/p ORIF of left hip. hypotension post op Patient is a 89y old  Male who presents with a chief complaint of Left Hip Pain (19 Sep 2017 16:48)    HPI:  Patient with hip fracture treated with locking intramedullary jay jay. Patient is feeling well a lttile tired and now needs to get back on his feet Denies chest pain SoB, palpitations. Recovery and ambulation compromised by her advanced age.  On postoperative xarelto for hip fracture, plus chronic atrial fibrillation. Chronic hyponatremia, of no clinical significance, at present.

## 2017-09-23 NOTE — PROGRESS NOTE ADULT - ATTENDING COMMENTS
No medical complications post-op to date and to proceed with physical therapy, as tolerated. Continues pulmonary toilet to lessen atelectasis, leg exercises as taught to lessen the risk of DVT and supervised pain medications for post-op pain control.

## 2017-09-23 NOTE — PROGRESS NOTE ADULT - SUBJECTIVE AND OBJECTIVE BOX
Patient is a 89y old  Male who presents with a chief complaint of Left Hip Pain (19 Sep 2017 16:48)    Patient is feeling well. He denies c/o chest pain, SOB or palpitations. Reduced cough.     Allergies    No Known Allergies      MEDICATIONS  (STANDING):  rivaroxaban 10 milliGRAM(s) Oral daily  atorvastatin 10 milliGRAM(s) Oral at bedtime  finasteride 5 milliGRAM(s) Oral daily  tamsulosin 0.4 milliGRAM(s) Oral at bedtime  buDESOnide 160 MICROgram(s)/formoterol 4.5 MICROgram(s) Inhaler 2 Puff(s) Inhalation two times a day  tiotropium 18 MICROgram(s) Capsule 1 Capsule(s) Inhalation daily  fluticasone propionate 50 MICROgram(s)/spray Nasal Spray 1 Spray(s) Both Nostrils two times a day  losartan 50 milliGRAM(s) Oral daily  metoprolol succinate ER 25 milliGRAM(s) Oral daily  sodium chloride 0.9%. 1000 milliLiter(s) (50 mL/Hr) IV Continuous <Continuous>    MEDICATIONS  (PRN):  acetaminophen   Tablet 650 milliGRAM(s) Oral every 6 hours PRN For Temp greater than 38 C (100.4 F)  ondansetron Injectable 4 milliGRAM(s) IV Push every 6 hours PRN Nausea  oxyCODONE    IR 5 milliGRAM(s) Oral every 4 hours PRN Mild Pain (1 - 3)  oxyCODONE    IR 10 milliGRAM(s) Oral every 4 hours PRN Moderate Pain (4 - 6)  HYDROmorphone  Injectable 0.5 milliGRAM(s) IV Push every 4 hours PRN Severe Pain (7 - 10)  ALBUTerol    0.083% 2.5 milliGRAM(s) Nebulizer every 6 hours PRN Shortness of Breath and/or Wheezing  senna 2 Tablet(s) Oral at bedtime PRN Constipation  docusate sodium 100 milliGRAM(s) Oral daily PRN Constipation  benzonatate 100 milliGRAM(s) Oral every 8 hours PRN Cough      PHYSICAL EXAM:  Vital Signs Last 24 Hrs  T(C): 36.6 (23 Sep 2017 04:41), Max: 37.1 (22 Sep 2017 13:48)  T(F): 97.8 (23 Sep 2017 04:41), Max: 98.7 (22 Sep 2017 13:48)  HR: 84 (23 Sep 2017 04:41) (84 - 127)  BP: 109/65 (23 Sep 2017 04:41) (98/64 - 112/67)  BP(mean): --  RR: 18 (23 Sep 2017 04:41) (16 - 18)  SpO2: 95% (23 Sep 2017 04:41) (92% - 96%)  Daily     Daily   I&O's Summary    21 Sep 2017 07:01  -  22 Sep 2017 07:00  --------------------------------------------------------  IN: 3030 mL / OUT: 2400 mL / NET: 630 mL    22 Sep 2017 07:01  -  23 Sep 2017 06:40  --------------------------------------------------------  IN: 1610 mL / OUT: 1100 mL / NET: 510 mL      General Appearance: 	 Alert, cooperative  HEENT: normocephalic, atraumatic  Neck: 3cm JVD,  carotid 2+  bilaterally without bruits  Lungs:  clear to auscultation and percussion bilaterally anteriorly  Cor:  pmi 5th ICS MCL, regular rate and rhythm, S1 normal intensity, S2 normal intensity, no gallops, Grader I/VI apical systolic murmur  Abdomen: soft, non-tender; bowel sounds normal; no masses,  no organomegaly  Extremities: without cyanosis, clubbing or edema  Vasc: 2-+ PT and DP pulses; LE varicosities    Labs:  CBC Full  -  ( 22 Sep 2017 16:39 )  WBC Count : 8.0 K/uL  Hemoglobin : 9.2 g/dL  Hematocrit : 27.6 %  Platelet Count - Automated : 294 K/uL  Mean Cell Volume : 86.0 fl  Mean Cell Hemoglobin : 28.7 pg  Mean Cell Hemoglobin Concentration : 33.3 gm/dL  Auto Neutrophil # : x  Auto Lymphocyte # : x  Auto Monocyte # : x  Auto Eosinophil # : x  Auto Basophil # : x  Auto Neutrophil % : x  Auto Lymphocyte % : x  Auto Monocyte % : x  Auto Eosinophil % : x  Auto Basophil % : x    09-22    130<L>  |  95<L>  |  16  ----------------------------<  106<H>  4.4   |  21<L>  |  0.86    Ca    7.9<L>      22 Sep 2017 06:01                              Carlo Mcconnell MD [facility]  [phone]

## 2017-09-24 LAB
ANION GAP SERPL CALC-SCNC: 9 MMOL/L — SIGNIFICANT CHANGE UP (ref 5–17)
BUN SERPL-MCNC: 17 MG/DL — SIGNIFICANT CHANGE UP (ref 7–23)
CALCIUM SERPL-MCNC: 8.1 MG/DL — LOW (ref 8.4–10.5)
CHLORIDE SERPL-SCNC: 96 MMOL/L — SIGNIFICANT CHANGE UP (ref 96–108)
CO2 SERPL-SCNC: 21 MMOL/L — LOW (ref 22–31)
CREAT SERPL-MCNC: 0.94 MG/DL — SIGNIFICANT CHANGE UP (ref 0.5–1.3)
GLUCOSE SERPL-MCNC: 98 MG/DL — SIGNIFICANT CHANGE UP (ref 70–99)
HCT VFR BLD CALC: 28.9 % — LOW (ref 39–50)
HGB BLD-MCNC: 9.7 G/DL — LOW (ref 13–17)
MCHC RBC-ENTMCNC: 27.6 PG — SIGNIFICANT CHANGE UP (ref 27–34)
MCHC RBC-ENTMCNC: 33.6 GM/DL — SIGNIFICANT CHANGE UP (ref 32–36)
MCV RBC AUTO: 82.3 FL — SIGNIFICANT CHANGE UP (ref 80–100)
PLATELET # BLD AUTO: 312 K/UL — SIGNIFICANT CHANGE UP (ref 150–400)
POTASSIUM SERPL-MCNC: 4.5 MMOL/L — SIGNIFICANT CHANGE UP (ref 3.5–5.3)
POTASSIUM SERPL-SCNC: 4.5 MMOL/L — SIGNIFICANT CHANGE UP (ref 3.5–5.3)
RBC # BLD: 3.51 M/UL — LOW (ref 4.2–5.8)
RBC # FLD: 16.7 % — HIGH (ref 10.3–14.5)
SODIUM SERPL-SCNC: 126 MMOL/L — LOW (ref 135–145)
WBC # BLD: 7.89 K/UL — SIGNIFICANT CHANGE UP (ref 3.8–10.5)
WBC # FLD AUTO: 7.89 K/UL — SIGNIFICANT CHANGE UP (ref 3.8–10.5)

## 2017-09-24 RX ADMIN — BUDESONIDE AND FORMOTEROL FUMARATE DIHYDRATE 2 PUFF(S): 160; 4.5 AEROSOL RESPIRATORY (INHALATION) at 07:42

## 2017-09-24 RX ADMIN — Medication 25 MILLIGRAM(S): at 07:33

## 2017-09-24 RX ADMIN — BUDESONIDE AND FORMOTEROL FUMARATE DIHYDRATE 2 PUFF(S): 160; 4.5 AEROSOL RESPIRATORY (INHALATION) at 17:50

## 2017-09-24 RX ADMIN — LOSARTAN POTASSIUM 50 MILLIGRAM(S): 100 TABLET, FILM COATED ORAL at 07:33

## 2017-09-24 RX ADMIN — FINASTERIDE 5 MILLIGRAM(S): 5 TABLET, FILM COATED ORAL at 11:57

## 2017-09-24 RX ADMIN — ATORVASTATIN CALCIUM 10 MILLIGRAM(S): 80 TABLET, FILM COATED ORAL at 22:21

## 2017-09-24 RX ADMIN — TIOTROPIUM BROMIDE 1 CAPSULE(S): 18 CAPSULE ORAL; RESPIRATORY (INHALATION) at 11:57

## 2017-09-24 RX ADMIN — Medication 1 SPRAY(S): at 07:33

## 2017-09-24 RX ADMIN — TAMSULOSIN HYDROCHLORIDE 0.4 MILLIGRAM(S): 0.4 CAPSULE ORAL at 22:21

## 2017-09-24 RX ADMIN — Medication 1 SPRAY(S): at 17:50

## 2017-09-24 RX ADMIN — RIVAROXABAN 10 MILLIGRAM(S): KIT at 11:57

## 2017-09-24 NOTE — PROGRESS NOTE ADULT - ATTENDING COMMENTS
Beginning to ambulate and doing well. No medical complications post-op to date and to proceed with physical therapy, as tolerated. Continues pulmonary toilet to lessen atelectasis, leg exercises as taught to lessen the risk of DVT and supervised pain medications for post-op pain control. I anticipate transfer to rehabilitation to follow when cleared by orthopedics.

## 2017-09-24 NOTE — PROGRESS NOTE ADULT - SUBJECTIVE AND OBJECTIVE BOX
Patient resting without complaints.  Denies chest pain, SOB, N/V.    T(C): 36.5 (09-24-17 @ 08:38)  T(F): 97.7 (09-24-17 @ 08:38)  HR: 65 (09-24-17 @ 08:38)  BP: 136/68 (09-24-17 @ 08:38)  RR: 16 (09-24-17 @ 08:38)  SpO2: 95% (09-24-17 @ 08:38)      Exam:  Alert and Oriented, No Acute Distress    L Lower Extremity:  Hip Dsg w/mod s/s saturation, changed; aissatou CDI  Calf Soft, Non-tender  +PF/DF  Sensation grossly intact                        9.7    7.89  )-----------( 312      ( 24 Sep 2017 09:04 )             28.9        126<L>  |  96  |  17  ----------------------------<  98  4.5   |  21<L>  |  0.94      A/P: 89y Male S/P L IMN, POD4; Stable  -Pain Control  -DVT PPx; IS  -Am labs  -WBAT LLE  -Continue Current Tx.    Kika Orozco PA-C  Orthopedic Surgery  Pagers [phone]

## 2017-09-24 NOTE — PROGRESS NOTE ADULT - SUBJECTIVE AND OBJECTIVE BOX
XIMENA MAMTA    Patient is a 89y old  Male who presents with a chief complaint of Left Hip Pain (19 Sep 2017 16:48)    Clinically stable.  Breathing stable.  Only complaint is chronic hiccups.  Denies hip pain, chest pain, or dyspnea.    Allergies    No Known Allergies    Intolerances      MEDICATIONS  (STANDING):  rivaroxaban 10 milliGRAM(s) Oral daily  atorvastatin 10 milliGRAM(s) Oral at bedtime  finasteride 5 milliGRAM(s) Oral daily  tamsulosin 0.4 milliGRAM(s) Oral at bedtime  buDESOnide 160 MICROgram(s)/formoterol 4.5 MICROgram(s) Inhaler 2 Puff(s) Inhalation two times a day  tiotropium 18 MICROgram(s) Capsule 1 Capsule(s) Inhalation daily  fluticasone propionate 50 MICROgram(s)/spray Nasal Spray 1 Spray(s) Both Nostrils two times a day  losartan 50 milliGRAM(s) Oral daily  metoprolol succinate ER 25 milliGRAM(s) Oral daily    MEDICATIONS  (PRN):  acetaminophen   Tablet 650 milliGRAM(s) Oral every 6 hours PRN For Temp greater than 38 C (100.4 F)  ondansetron Injectable 4 milliGRAM(s) IV Push every 6 hours PRN Nausea  oxyCODONE    IR 5 milliGRAM(s) Oral every 4 hours PRN Mild Pain (1 - 3)  oxyCODONE    IR 10 milliGRAM(s) Oral every 4 hours PRN Moderate Pain (4 - 6)  HYDROmorphone  Injectable 0.5 milliGRAM(s) IV Push every 4 hours PRN Severe Pain (7 - 10)  ALBUTerol    0.083% 2.5 milliGRAM(s) Nebulizer every 6 hours PRN Shortness of Breath and/or Wheezing  senna 2 Tablet(s) Oral at bedtime PRN Constipation  docusate sodium 100 milliGRAM(s) Oral daily PRN Constipation  benzonatate 100 milliGRAM(s) Oral every 8 hours PRN Cough    PHYSICAL EXAM:  Vital Signs Last 24 Hrs  T(C): 36.6 (24 Sep 2017 12:56), Max: 36.8 (23 Sep 2017 20:51)  T(F): 97.8 (24 Sep 2017 12:56), Max: 98.3 (23 Sep 2017 20:51)  HR: 67 (24 Sep 2017 12:56) (65 - 80)  BP: 127/78 (24 Sep 2017 12:56) (118/69 - 139/86)  BP(mean): --  RR: 16 (24 Sep 2017 12:56) (16 - 18)  SpO2: 95% (24 Sep 2017 12:56) (94% - 95%)  Daily     Daily   I&O's Summary    23 Sep 2017 07:01  -  24 Sep 2017 07:00  --------------------------------------------------------  IN: 1735 mL / OUT: 700 mL / NET: 1035 mL    24 Sep 2017 07:01  -  24 Sep 2017 15:45  --------------------------------------------------------  IN: 840 mL / OUT: 600 mL / NET: 240 mL    General Appearance: 	 Alert, cooperative, no distress  Neck: no JVD  Lungs:  clear to auscultation and percussion bilaterally  Cor:  pmi 5th ICS MCL, regular rate and rhythm, S1 normal intensity, S2 normal intensity, RODRIGUEZ  Abdomen:	 soft, non-tender; bowel sounds normal; no masses,  no organomegaly  Extremities: without cyanosis, clubbing or edema    Labs:  CBC Full  -  ( 24 Sep 2017 09:04 )  WBC Count : 7.89 K/uL  Hemoglobin : 9.7 g/dL  Hematocrit : 28.9 %  Platelet Count - Automated : 312 K/uL  Mean Cell Volume : 82.3 fl  Mean Cell Hemoglobin : 27.6 pg  Mean Cell Hemoglobin Concentration : 33.6 gm/dL  Auto Neutrophil # : x  Auto Lymphocyte # : x  Auto Monocyte # : x  Auto Eosinophil # : x  Auto Basophil # : x  Auto Neutrophil % : x  Auto Lymphocyte % : x  Auto Monocyte % : x  Auto Eosinophil % : x  Auto Basophil % : x    Basic Metabolic Panel in AM (09.24.17 @ 08:52)    Sodium, Serum: 126 mmol/L    Potassium, Serum: 4.5 mmol/L    Chloride, Serum: 96 mmol/L    Carbon Dioxide, Serum: 21 mmol/L    Anion Gap, Serum: 9 mmol/L    Blood Urea Nitrogen, Serum: 17 mg/dL    Creatinine, Serum: 0.94 mg/dL    Glucose, Serum: 98 mg/dL    Calcium, Total Serum: 8.1 mg/dL    eGFR if Non : 68: Interpretative comment  The units for eGFR are ml/min/1.73m2 (normalized body surface area). The  eGFR is calculated from a serum creatinine using the CKD-EPI equation.  Other variables required for calculation are race, age and sex. Among  patients w68: ith chronic kidney disease (CKD), the eGFR is useful in  determining the stage of disease according to KDOQI CKD classification.  All eGFR results are reported numerically with the following  interpretation.          GFR                    With68:                  Without     (ml/min/1.73 m2)    Kidney Damage       Kidney Damage        >= 90                    Stage 1                     Normal        60-89                    Stage 2                     Decreased GFR        :      Stage 3                     Stage 3        15-29                    Stage 4                     Stage 4        < 15                      Stage 5                     Stage 5  Each stage of CKD assumes that the associated GFR level has been in  eff68: ect for at least 3 months. Determination of stages one and two (with  eGFR > 59 ml/min/m2) requires estimation of kidney damage for at least 3  months as defined by structural or functional abnormalities.  Limitations: All estimates of GFR will be les68: s accurate for patients at  extremes of muscle mass (including but not limited to frail elderly,  critically ill, or cancer patients), those with unusual diets, and those  with conditions associated with reduced secretion or extrarenal  elimination of68:  creatinine. The eGFR equation is not recommended for use  in patients with unstable creatinine levels. mL/min/1.73M2    eGFR if African American: 78 mL/min/1.73M2      Impression/Plan: Ischemic cardiomyopathy with severe LV dysfunction, compensated  Post op anemia now with stable hemoglobin  s/p Fall with L hip fracture  Stable from cardiac standpoint  Awaiting rehab placement  Continue xarelto for DVT prophylaxis    Beck Arevalo MD, [facility]  [facility]

## 2017-09-24 NOTE — PROGRESS NOTE ADULT - ASSESSMENT
Patient is a 89y old  Male who presents with a chief complaint of Left Hip Pain (19 Sep 2017 16:48)    HPI:  Patient with hip fracture treated with locking intramedullary jay jay. Patient is feeling well a lttile tired and now needs to get back on his feet Denies chest pain SoB, palpitations. Recovery and ambulation compromised by her advanced age.  On postoperative xarelto for hip fracture, plus chronic atrial fibrillation. Chronic hyponatremia, of no clinical significance, at present.

## 2017-09-24 NOTE — PROGRESS NOTE ADULT - SUBJECTIVE AND OBJECTIVE BOX
Patient is a 89y old  Male who presents with a chief complaint of Left Hip Pain (19 Sep 2017 16:48)    HPI:  Patient with hip fracture treated with locking intramedullary jay jay. Patient is feeling well a lttile tired and now needs to get back on his feet. Denies chest pain SoB, palpitations. Recovery and ambulation compromised by her advanced age.  On postoperative xarelto for hip fracture, plus chronic atrial fibrillation. He complains of a dry cough.         MEDICATIONS  (STANDING):  rivaroxaban 10 milliGRAM(s) Oral daily  atorvastatin 10 milliGRAM(s) Oral at bedtime  finasteride 5 milliGRAM(s) Oral daily  tamsulosin 0.4 milliGRAM(s) Oral at bedtime  buDESOnide 160 MICROgram(s)/formoterol 4.5 MICROgram(s) Inhaler 2 Puff(s) Inhalation two times a day  tiotropium 18 MICROgram(s) Capsule 1 Capsule(s) Inhalation daily  fluticasone propionate 50 MICROgram(s)/spray Nasal Spray 1 Spray(s) Both Nostrils two times a day  losartan 50 milliGRAM(s) Oral daily  metoprolol succinate ER 25 milliGRAM(s) Oral daily    MEDICATIONS  (PRN):  acetaminophen   Tablet 650 milliGRAM(s) Oral every 6 hours PRN For Temp greater than 38 C (100.4 F)  ondansetron Injectable 4 milliGRAM(s) IV Push every 6 hours PRN Nausea  oxyCODONE    IR 5 milliGRAM(s) Oral every 4 hours PRN Mild Pain (1 - 3)  oxyCODONE    IR 10 milliGRAM(s) Oral every 4 hours PRN Moderate Pain (4 - 6)  HYDROmorphone  Injectable 0.5 milliGRAM(s) IV Push every 4 hours PRN Severe Pain (7 - 10)  ALBUTerol    0.083% 2.5 milliGRAM(s) Nebulizer every 6 hours PRN Shortness of Breath and/or Wheezing  senna 2 Tablet(s) Oral at bedtime PRN Constipation  docusate sodium 100 milliGRAM(s) Oral daily PRN Constipation  benzonatate 100 milliGRAM(s) Oral every 8 hours PRN Cough    Allergies    No Known Allergies    Intolerances        REVIEW OF SYSTEM:  CONSTITUTIONAL: No fever, No change in weight, No fatigue  HEAD: No headache, No dizziness, No recent trauma  EYES: No eye pain, No visual disturbances, No discharge  ENT:  No difficulty hearing, No tinnitus, No vertigo, No sinus pain, No throat pain  NECK: No pain, No stiffness  BREASTS: No pain, No masses, No nipple discharge  RESPIRATORY: No cough, No wheezing, No chills, No hemoptysis, No shortness of breath at rest or exertional shortness of breath  CARDIOVASCULAR: No chest pain, No palpitations, No dizziness, No CHF, No arrhythmia, No cardiomegaly, No leg swelling  GASTROINTESTINAL: No abdominal, No epigastric pain. No nausea, No vomiting, No hematemesis, No diarrhea, No constipation. No melena, No hematochezia. No GERD  GENITOURINARY: No dysuria, No frequency, No hematuria, No incontinence, No nocturia, No hesitancy,  SKIN: No itching, No burning, No rashes, No lesions   LYMPH NODES: No history of enlarged glands  ENDOCRINE: No heat or cold intolerance, No hair loss. No osteoporosis, No thyroid disease  MUSCULOSKELETAL: left hip pain, status post fall and hip fracture  PSYCHIATRIC: No depression, No anxiety, No mood swings, No difficulty sleeping  HEME/LYMPH: No easy bruising, No anticoagulants, No bleeding disorder, No bleeding gums  ALLERGY AND IMMUNOLOGIC: No hives, No eczema  NEUROLOGICAL: No memory loss, No loss of strength, No numbness, No tremors        Vital Signs Last 24 Hrs  T(C): 36.3 (24 Sep 2017 16:10), Max: 36.8 (23 Sep 2017 20:51)  T(F): 97.4 (24 Sep 2017 16:10), Max: 98.3 (23 Sep 2017 20:51)  HR: 74 (24 Sep 2017 16:10) (65 - 80)  BP: 124/74 (24 Sep 2017 16:10) (118/69 - 139/86)  BP(mean): --  RR: 16 (24 Sep 2017 16:10) (16 - 18)  SpO2: 95% (24 Sep 2017 16:10) (94% - 95%)  09-21 @ 07:01  -  09-22 @ 07:00  --------------------------------------------------------  IN: 3030 mL / OUT: 2400 mL / NET: 630 mL    09-22 @ 07:01  -  09-23 @ 06:45  --------------------------------------------------------  IN: 1610 mL / OUT: 1100 mL / NET: 510 mL        PHYSICAL EXAM:  GENERAL: NAD, well nourished and conversant  HEAD:  Atraumatic  EYES: EOM, PERRLA, conjunctiva pink and sclera white  ENT: No tonsillar erythema, exudates, or enlargement, moist mucous membranes, good dentition, no lesions  NECK: Supple, No JVD, normal thyroid, carotids with normal upstrokes and no bruits  CHEST/LUNG: Clear to auscultation bilaterally, No rales, rhonchi, wheezing, or rubs  HEART: Regular rate and rhythm, No murmurs, rubs, or gallops  ABDOMEN: Soft, nondistended, no masses, guarding, tenderness or rebound, bowel sounds present  EXTREMITIES:  2+ Peripheral Pulses, No clubbing, cyanosis, or edema. No arthritis of shoulders, elbows, hands, knees, ankles, or feet. No DJD C spine, T spine, or L/S spine. 4+ left hip swelling with no redness or discharge.  LYMPH: No lymphadenopathy noted  SKIN: No rashes or lesions  NERVOUS SYSTEM:  Alert & Oriented X3, normal cognitive function. Motor Strength 5/5 right upper and right lower.  5/5 left upper and left lower extremities, DTRs 2+ intact and symmetric    LABS:      CBC Full  -  ( 24 Sep 2017 09:04 )  WBC Count : 7.89 K/uL  Hemoglobin : 9.7 g/dL  Hematocrit : 28.9 %  Platelet Count - Automated : 312 K/uL  Mean Cell Volume : 82.3 fl  Mean Cell Hemoglobin : 27.6 pg  Mean Cell Hemoglobin Concentration : 33.6 gm/dL  Auto Neutrophil # : x  Auto Lymphocyte # : x  Auto Monocyte # : x  Auto Eosinophil # : x  Auto Basophil # : x  Auto Neutrophil % : x  Auto Lymphocyte % : x  Auto Monocyte % : x  Auto Eosinophil % : x  Auto Basophil % : x    09-24    126<L>  |  96  |  17  ----------------------------<  98  4.5   |  21<L>  |  0.94    Ca    8.1<L>      24 Sep 2017 08:52    Chronic hyponatremia is unchanged                RADIOLOGY & ADDITIONAL TESTS:      Consultant(s):    Care Discussed with Consultants/Other Providers [ ] YES  [ ] NO Patient is a 89y old  Male who presents with a chief complaint of Left Hip Pain (19 Sep 2017 16:48)    HPI:  Patient with hip fracture treated with locking intramedullary jay jay. Patient is feeling well a lttile tired and now needs to get back on his feet. Denies chest pain SoB, palpitations. Recovery and ambulation compromised by her advanced age.  On postoperative xarelto for hip fracture, plus chronic atrial fibrillation. He complains of a dry cough.  He also complains of hiccups today.       MEDICATIONS  (STANDING):  rivaroxaban 10 milliGRAM(s) Oral daily  atorvastatin 10 milliGRAM(s) Oral at bedtime  finasteride 5 milliGRAM(s) Oral daily  tamsulosin 0.4 milliGRAM(s) Oral at bedtime  buDESOnide 160 MICROgram(s)/formoterol 4.5 MICROgram(s) Inhaler 2 Puff(s) Inhalation two times a day  tiotropium 18 MICROgram(s) Capsule 1 Capsule(s) Inhalation daily  fluticasone propionate 50 MICROgram(s)/spray Nasal Spray 1 Spray(s) Both Nostrils two times a day  losartan 50 milliGRAM(s) Oral daily  metoprolol succinate ER 25 milliGRAM(s) Oral daily    MEDICATIONS  (PRN):  acetaminophen   Tablet 650 milliGRAM(s) Oral every 6 hours PRN For Temp greater than 38 C (100.4 F)  ondansetron Injectable 4 milliGRAM(s) IV Push every 6 hours PRN Nausea  oxyCODONE    IR 5 milliGRAM(s) Oral every 4 hours PRN Mild Pain (1 - 3)  oxyCODONE    IR 10 milliGRAM(s) Oral every 4 hours PRN Moderate Pain (4 - 6)  HYDROmorphone  Injectable 0.5 milliGRAM(s) IV Push every 4 hours PRN Severe Pain (7 - 10)  ALBUTerol    0.083% 2.5 milliGRAM(s) Nebulizer every 6 hours PRN Shortness of Breath and/or Wheezing  senna 2 Tablet(s) Oral at bedtime PRN Constipation  docusate sodium 100 milliGRAM(s) Oral daily PRN Constipation  benzonatate 100 milliGRAM(s) Oral every 8 hours PRN Cough    Allergies    No Known Allergies    Intolerances        REVIEW OF SYSTEM:  CONSTITUTIONAL: No fever, No change in weight, No fatigue  HEAD: No headache, No dizziness, No recent trauma  EYES: No eye pain, No visual disturbances, No discharge  ENT:  No difficulty hearing, No tinnitus, No vertigo, No sinus pain, No throat pain  NECK: No pain, No stiffness  BREASTS: No pain, No masses, No nipple discharge  RESPIRATORY: No cough, No wheezing, No chills, No hemoptysis, No shortness of breath at rest or exertional shortness of breath  CARDIOVASCULAR: No chest pain, No palpitations, No dizziness, No CHF, No arrhythmia, No cardiomegaly, No leg swelling  GASTROINTESTINAL: No abdominal, No epigastric pain. No nausea, No vomiting, No hematemesis, No diarrhea, No constipation. No melena, No hematochezia. No GERD  GENITOURINARY: No dysuria, No frequency, No hematuria, No incontinence, No nocturia, No hesitancy,  SKIN: No itching, No burning, No rashes, No lesions   LYMPH NODES: No history of enlarged glands  ENDOCRINE: No heat or cold intolerance, No hair loss. No osteoporosis, No thyroid disease  MUSCULOSKELETAL: left hip pain, status post fall and hip fracture  PSYCHIATRIC: No depression, No anxiety, No mood swings, No difficulty sleeping  HEME/LYMPH: No easy bruising, No anticoagulants, No bleeding disorder, No bleeding gums  ALLERGY AND IMMUNOLOGIC: No hives, No eczema  NEUROLOGICAL: No memory loss, No loss of strength, No numbness, No tremors        Vital Signs Last 24 Hrs  T(C): 36.3 (24 Sep 2017 16:10), Max: 36.8 (23 Sep 2017 20:51)  T(F): 97.4 (24 Sep 2017 16:10), Max: 98.3 (23 Sep 2017 20:51)  HR: 74 (24 Sep 2017 16:10) (65 - 80)  BP: 124/74 (24 Sep 2017 16:10) (118/69 - 139/86)  BP(mean): --  RR: 16 (24 Sep 2017 16:10) (16 - 18)  SpO2: 95% (24 Sep 2017 16:10) (94% - 95%)  09-21 @ 07:01  -  09-22 @ 07:00  --------------------------------------------------------  IN: 3030 mL / OUT: 2400 mL / NET: 630 mL    09-22 @ 07:01 - 09-23 @ 06:45  --------------------------------------------------------  IN: 1610 mL / OUT: 1100 mL / NET: 510 mL        PHYSICAL EXAM:  GENERAL: NAD, well nourished and conversant  HEAD:  Atraumatic  EYES: EOM, PERRLA, conjunctiva pink and sclera white  ENT: No tonsillar erythema, exudates, or enlargement, moist mucous membranes, good dentition, no lesions  NECK: Supple, No JVD, normal thyroid, carotids with normal upstrokes and no bruits  CHEST/LUNG: Clear to auscultation bilaterally, No rales, rhonchi, wheezing, or rubs  HEART: Regular rate and rhythm, No murmurs, rubs, or gallops  ABDOMEN: Soft, nondistended, no masses, guarding, tenderness or rebound, bowel sounds present  EXTREMITIES:  2+ Peripheral Pulses, No clubbing, cyanosis, or edema. No arthritis of shoulders, elbows, hands, knees, ankles, or feet. No DJD C spine, T spine, or L/S spine. 4+ left hip swelling with no redness or discharge.  LYMPH: No lymphadenopathy noted  SKIN: No rashes or lesions  NERVOUS SYSTEM:  Alert & Oriented X3, normal cognitive function. Motor Strength 5/5 right upper and right lower.  5/5 left upper and left lower extremities, DTRs 2+ intact and symmetric    LABS:      CBC Full  -  ( 24 Sep 2017 09:04 )  WBC Count : 7.89 K/uL  Hemoglobin : 9.7 g/dL  Hematocrit : 28.9 %  Platelet Count - Automated : 312 K/uL  Mean Cell Volume : 82.3 fl  Mean Cell Hemoglobin : 27.6 pg  Mean Cell Hemoglobin Concentration : 33.6 gm/dL  Auto Neutrophil # : x  Auto Lymphocyte # : x  Auto Monocyte # : x  Auto Eosinophil # : x  Auto Basophil # : x  Auto Neutrophil % : x  Auto Lymphocyte % : x  Auto Monocyte % : x  Auto Eosinophil % : x  Auto Basophil % : x    09-24    126<L>  |  96  |  17  ----------------------------<  98  4.5   |  21<L>  |  0.94    Ca    8.1<L>      24 Sep 2017 08:52    Chronic hyponatremia is unchanged                RADIOLOGY & ADDITIONAL TESTS:      Consultant(s):    Care Discussed with Consultants/Other Providers [ ] YES  [ ] NO

## 2017-09-25 ENCOUNTER — TRANSCRIPTION ENCOUNTER (OUTPATIENT)
Age: 82
End: 2017-09-25

## 2017-09-25 DIAGNOSIS — I49.9 CARDIAC ARRHYTHMIA, UNSPECIFIED: ICD-10-CM

## 2017-09-25 PROCEDURE — 93010 ELECTROCARDIOGRAM REPORT: CPT

## 2017-09-25 PROCEDURE — 99232 SBSQ HOSP IP/OBS MODERATE 35: CPT

## 2017-09-25 RX ORDER — LOSARTAN POTASSIUM 100 MG/1
1 TABLET, FILM COATED ORAL
Qty: 0 | Refills: 0 | COMMUNITY

## 2017-09-25 RX ORDER — DOCUSATE SODIUM 100 MG
1 CAPSULE ORAL
Qty: 0 | Refills: 0 | COMMUNITY
Start: 2017-09-25

## 2017-09-25 RX ORDER — ACETAMINOPHEN 500 MG
2 TABLET ORAL
Qty: 0 | Refills: 0 | COMMUNITY
Start: 2017-09-25

## 2017-09-25 RX ORDER — TAMSULOSIN HYDROCHLORIDE 0.4 MG/1
1 CAPSULE ORAL
Qty: 0 | Refills: 0 | COMMUNITY
Start: 2017-09-25

## 2017-09-25 RX ORDER — LOSARTAN POTASSIUM 100 MG/1
1 TABLET, FILM COATED ORAL
Qty: 0 | Refills: 0 | COMMUNITY
Start: 2017-09-25

## 2017-09-25 RX ORDER — METOPROLOL TARTRATE 50 MG
25 TABLET ORAL ONCE
Qty: 0 | Refills: 0 | Status: COMPLETED | OUTPATIENT
Start: 2017-09-25 | End: 2017-09-25

## 2017-09-25 RX ORDER — OXYCODONE HYDROCHLORIDE 5 MG/1
1 TABLET ORAL
Qty: 0 | Refills: 0 | COMMUNITY
Start: 2017-09-25

## 2017-09-25 RX ORDER — TAMSULOSIN HYDROCHLORIDE 0.4 MG/1
2 CAPSULE ORAL
Qty: 0 | Refills: 0 | COMMUNITY

## 2017-09-25 RX ORDER — TIOTROPIUM BROMIDE 18 UG/1
1 CAPSULE ORAL; RESPIRATORY (INHALATION)
Qty: 0 | Refills: 0 | COMMUNITY
Start: 2017-09-25

## 2017-09-25 RX ORDER — RIVAROXABAN 15 MG-20MG
1 KIT ORAL
Qty: 0 | Refills: 0 | COMMUNITY
Start: 2017-09-25

## 2017-09-25 RX ORDER — RIVAROXABAN 15 MG-20MG
15 KIT ORAL EVERY 24 HOURS
Qty: 0 | Refills: 0 | Status: DISCONTINUED | OUTPATIENT
Start: 2017-09-25 | End: 2017-09-26

## 2017-09-25 RX ORDER — ALPRAZOLAM 0.25 MG
1 TABLET ORAL
Qty: 0 | Refills: 0 | COMMUNITY

## 2017-09-25 RX ORDER — METOPROLOL TARTRATE 50 MG
50 TABLET ORAL
Qty: 0 | Refills: 0 | Status: DISCONTINUED | OUTPATIENT
Start: 2017-09-25 | End: 2017-09-29

## 2017-09-25 RX ADMIN — BUDESONIDE AND FORMOTEROL FUMARATE DIHYDRATE 2 PUFF(S): 160; 4.5 AEROSOL RESPIRATORY (INHALATION) at 06:01

## 2017-09-25 RX ADMIN — FINASTERIDE 5 MILLIGRAM(S): 5 TABLET, FILM COATED ORAL at 13:04

## 2017-09-25 RX ADMIN — TIOTROPIUM BROMIDE 1 CAPSULE(S): 18 CAPSULE ORAL; RESPIRATORY (INHALATION) at 15:52

## 2017-09-25 RX ADMIN — TAMSULOSIN HYDROCHLORIDE 0.4 MILLIGRAM(S): 0.4 CAPSULE ORAL at 21:55

## 2017-09-25 RX ADMIN — Medication 1 SPRAY(S): at 06:01

## 2017-09-25 RX ADMIN — LOSARTAN POTASSIUM 50 MILLIGRAM(S): 100 TABLET, FILM COATED ORAL at 06:02

## 2017-09-25 RX ADMIN — Medication 25 MILLIGRAM(S): at 06:02

## 2017-09-25 RX ADMIN — Medication 25 MILLIGRAM(S): at 07:38

## 2017-09-25 RX ADMIN — RIVAROXABAN 15 MILLIGRAM(S): KIT at 17:29

## 2017-09-25 RX ADMIN — BUDESONIDE AND FORMOTEROL FUMARATE DIHYDRATE 2 PUFF(S): 160; 4.5 AEROSOL RESPIRATORY (INHALATION) at 17:29

## 2017-09-25 RX ADMIN — ATORVASTATIN CALCIUM 10 MILLIGRAM(S): 80 TABLET, FILM COATED ORAL at 21:55

## 2017-09-25 RX ADMIN — Medication 1 SPRAY(S): at 17:29

## 2017-09-25 RX ADMIN — Medication 50 MILLIGRAM(S): at 17:29

## 2017-09-25 NOTE — DISCHARGE NOTE ADULT - PATIENT PORTAL LINK FT
“You can access the [facility] Patient Portal, offered by [facility], by registering with the following website: [email or web address]”

## 2017-09-25 NOTE — PROGRESS NOTE ADULT - ASSESSMENT
Patient with hip fracture treated with locking intramedullary jay jay. Patient is feeling well a lttile tired and now needs to get back on his feet Denies chest pain SoB, palpitations. Recovery and ambulation compromised by her advanced age.  On postoperative xarelto for hip fracture, plus chronic atrial fibrillation. Chronic hyponatremia, of no clinical significance, at present.

## 2017-09-25 NOTE — PROGRESS NOTE ADULT - PROBLEM SELECTOR PLAN 5
work up in progress-lytes etc.--improved but elevated k+ work up in progress-lytes etc.--improved but elevated k+; chronic and assymptomatic

## 2017-09-25 NOTE — PROGRESS NOTE ADULT - SUBJECTIVE AND OBJECTIVE BOX
Patient is a 89y old  Male who presents with a chief complaint of Left Hip Pain, IT Fracture/ Fixation with IM Nail (25 Sep 2017 06:45)    He c/o hiccough. He denies c/o chest pain, SOB or palpitations.    Allergies    No Known Allergies    MEDICATIONS  (STANDING):  rivaroxaban 10 milliGRAM(s) Oral daily  atorvastatin 10 milliGRAM(s) Oral at bedtime  finasteride 5 milliGRAM(s) Oral daily  tamsulosin 0.4 milliGRAM(s) Oral at bedtime  buDESOnide 160 MICROgram(s)/formoterol 4.5 MICROgram(s) Inhaler 2 Puff(s) Inhalation two times a day  tiotropium 18 MICROgram(s) Capsule 1 Capsule(s) Inhalation daily  fluticasone propionate 50 MICROgram(s)/spray Nasal Spray 1 Spray(s) Both Nostrils two times a day  losartan 50 milliGRAM(s) Oral daily  metoprolol succinate ER 25 milliGRAM(s) Oral daily    MEDICATIONS  (PRN):  acetaminophen   Tablet 650 milliGRAM(s) Oral every 6 hours PRN For Temp greater than 38 C (100.4 F)  ondansetron Injectable 4 milliGRAM(s) IV Push every 6 hours PRN Nausea  oxyCODONE    IR 5 milliGRAM(s) Oral every 4 hours PRN Mild Pain (1 - 3)  oxyCODONE    IR 10 milliGRAM(s) Oral every 4 hours PRN Moderate Pain (4 - 6)  HYDROmorphone  Injectable 0.5 milliGRAM(s) IV Push every 4 hours PRN Severe Pain (7 - 10)  ALBUTerol    0.083% 2.5 milliGRAM(s) Nebulizer every 6 hours PRN Shortness of Breath and/or Wheezing  senna 2 Tablet(s) Oral at bedtime PRN Constipation  docusate sodium 100 milliGRAM(s) Oral daily PRN Constipation  benzonatate 100 milliGRAM(s) Oral every 8 hours PRN Cough      PHYSICAL EXAM:  Vital Signs Last 24 Hrs  T(C): 36.5 (25 Sep 2017 04:29), Max: 36.7 (24 Sep 2017 21:20)  T(F): 97.7 (25 Sep 2017 04:29), Max: 98 (24 Sep 2017 21:20)  HR: 69 (25 Sep 2017 04:29) (65 - 74)  BP: 128/67 (25 Sep 2017 04:29) (122/72 - 136/68)  BP(mean): --  RR: 16 (25 Sep 2017 04:29) (16 - 16)  SpO2: 94% (25 Sep 2017 04:29) (94% - 95%)  Daily     Daily Weight in k.1 (25 Sep 2017 06:45)  I&O's Summary    24 Sep 2017 07:01  -  25 Sep 2017 07:00  --------------------------------------------------------  IN: 1460 mL / OUT: 750 mL / NET: 710 mL    Heart rate on exam 110-130/min irregular    General Appearance: 	 Alert, cooperative  HEENT: normocephalic, atraumatic  Neck: 3cm JVD,  carotid 2+  bilaterally without bruits  Lungs:  clear to auscultation and percussion bilaterally anteriorly  Cor:  pmi 5th ICS MCL, tachycardic irregular rate and rhythm, S1 normal intensity, S2 normal intensity, no gallops, Grader I/VI apical systolic murmur  Abdomen: soft, non-tender; bowel sounds normal; no masses,  no organomegaly  Extremities: without cyanosis, clubbing or edema  Vasc: 2-+ PT and DP pulses; LE varicosities    Labs:  CBC Full  -  ( 24 Sep 2017 09:04 )  WBC Count : 7.89 K/uL  Hemoglobin : 9.7 g/dL  Hematocrit : 28.9 %  Platelet Count - Automated : 312 K/uL  Mean Cell Volume : 82.3 fl  Mean Cell Hemoglobin : 27.6 pg  Mean Cell Hemoglobin Concentration : 33.6 gm/dL  Auto Neutrophil # : x  Auto Lymphocyte # : x  Auto Monocyte # : x  Auto Eosinophil # : x  Auto Basophil # : x  Auto Neutrophil % : x  Auto Lymphocyte % : x  Auto Monocyte % : x  Auto Eosinophil % : x  Auto Basophil % : x        126<L>  |  96  |  17  ----------------------------<  98  4.5   |  21<L>  |  0.94    Ca    8.1<L>      24 Sep 2017 08:52                      Carlo Mcconnell MD [facility]  [phone]

## 2017-09-25 NOTE — PROGRESS NOTE ADULT - SUBJECTIVE AND OBJECTIVE BOX
CHIEF COMPLAINT:    Interval Events:    REVIEW OF SYSTEMS:  Constitutional: No fevers or chills. No weight loss. No fatigue or generalized malaise.  Eyes: No itching or discharge from the eyes  ENT: No ear pain. No ear discharge. No nasal congestion. No post nasal drip. No epistaxis. No throat pain. No sore throat. No difficulty swallowing.   CV: No chest pain. No palpitations. No lightheadedness or dizziness.   Resp: No dyspnea at rest. No dyspnea on exertion. No orthopnea. No wheezing. No cough. No stridor. No sputum production. No chest pain with respiration.  GI: No nausea. No vomiting. No diarrhea.  MSK: No joint pain or pain in any extremities  Integumentary: No skin lesions. No pedal edema.  Neurological: No gross motor weakness. No sensory changes.  [ ] All other systems negative  [ ] Unable to assess ROS because ________    OBJECTIVE:  ICU Vital Signs Last 24 Hrs  T(C): 36.5 (25 Sep 2017 04:29), Max: 36.7 (24 Sep 2017 21:20)  T(F): 97.7 (25 Sep 2017 04:29), Max: 98 (24 Sep 2017 21:20)  HR: 69 (25 Sep 2017 04:29) (65 - 74)  BP: 128/67 (25 Sep 2017 04:29) (122/72 - 136/68)  BP(mean): --  ABP: --  ABP(mean): --  RR: 16 (25 Sep 2017 04:29) (16 - 16)  SpO2: 94% (25 Sep 2017 04:29) (94% - 95%)        09-23 @ 07:01 - 09-24 @ 07:00  --------------------------------------------------------  IN: 1735 mL / OUT: 700 mL / NET: 1035 mL    09-24 @ 07:01 - 09-25 @ 05:28  --------------------------------------------------------  IN: 1460 mL / OUT: 750 mL / NET: 710 mL      CAPILLARY BLOOD GLUCOSE          PHYSICAL EXAM:  General: Awake, alert, oriented X 3.   HEENT: Atraumatic, normocephalic.                 Mallampatti Grade                 No nasal congestion.                No tonsillar or pharyngeal exudates.  Lymph Nodes: No palpable lymphadenopathy  Neck: No JVD. No carotid bruit.   Respiratory: Normal chest expansion                         Normal percussion                         Normal and equal air entry                         No wheeze, rhonchi or rales.  Cardiovascular: S1 S2 normal. No murmurs, rubs or gallops.   Abdomen: Soft, non-tender, non-distended. No organomegaly.  Extremities: Warm to touch. Peripheral pulse palpable. No pedal edema.   Skin: No rashes or skin lesions  Neurological: Motor and sensory examination equal and normal in all four extremities.  Psychiatry: Appropriate mood and affect.    HOSPITAL MEDICATIONS:  MEDICATIONS  (STANDING):  rivaroxaban 10 milliGRAM(s) Oral daily  atorvastatin 10 milliGRAM(s) Oral at bedtime  finasteride 5 milliGRAM(s) Oral daily  tamsulosin 0.4 milliGRAM(s) Oral at bedtime  buDESOnide 160 MICROgram(s)/formoterol 4.5 MICROgram(s) Inhaler 2 Puff(s) Inhalation two times a day  tiotropium 18 MICROgram(s) Capsule 1 Capsule(s) Inhalation daily  fluticasone propionate 50 MICROgram(s)/spray Nasal Spray 1 Spray(s) Both Nostrils two times a day  losartan 50 milliGRAM(s) Oral daily  metoprolol succinate ER 25 milliGRAM(s) Oral daily    MEDICATIONS  (PRN):  acetaminophen   Tablet 650 milliGRAM(s) Oral every 6 hours PRN For Temp greater than 38 C (100.4 F)  ondansetron Injectable 4 milliGRAM(s) IV Push every 6 hours PRN Nausea  oxyCODONE    IR 5 milliGRAM(s) Oral every 4 hours PRN Mild Pain (1 - 3)  oxyCODONE    IR 10 milliGRAM(s) Oral every 4 hours PRN Moderate Pain (4 - 6)  HYDROmorphone  Injectable 0.5 milliGRAM(s) IV Push every 4 hours PRN Severe Pain (7 - 10)  ALBUTerol    0.083% 2.5 milliGRAM(s) Nebulizer every 6 hours PRN Shortness of Breath and/or Wheezing  senna 2 Tablet(s) Oral at bedtime PRN Constipation  docusate sodium 100 milliGRAM(s) Oral daily PRN Constipation  benzonatate 100 milliGRAM(s) Oral every 8 hours PRN Cough      LABS:                        9.7    7.89  )-----------( 312      ( 24 Sep 2017 09:04 )             28.9     09-24    126<L>  |  96  |  17  ----------------------------<  98  4.5   |  21<L>  |  0.94    Ca    8.1<L>      24 Sep 2017 08:52                MICROBIOLOGY:     RADIOLOGY:  [ ] Reviewed and interpreted by me    Point of Care Ultrasound Findings:    PFT:    EKG: CHIEF COMPLAINT:good spirits--no sob or cough; minimal pain    Interval Events:ambulated w/PT    REVIEW OF SYSTEMS:  Constitutional: No fevers or chills. No weight loss. No fatigue or generalized malaise.  Eyes: No itching or discharge from the eyes  ENT: No ear pain. No ear discharge. No nasal congestion. No post nasal drip. No epistaxis. No throat pain. No sore throat. No difficulty swallowing.   CV: No chest pain. No palpitations. No lightheadedness or dizziness.   Resp: No dyspnea at rest. No dyspnea on exertion. No orthopnea. No wheezing. No cough. No stridor. No sputum production. No chest pain with respiration.  GI: No nausea. No vomiting. No diarrhea.  MSK: No joint pain or pain in any extremities  Integumentary: No skin lesions. No pedal edema.  Neurological: mild gross motor weakness. No sensory changes.  [+ ] All other systems negative  [ ] Unable to assess ROS because ________    OBJECTIVE:  ICU Vital Signs Last 24 Hrs  T(C): 36.5 (25 Sep 2017 04:29), Max: 36.7 (24 Sep 2017 21:20)  T(F): 97.7 (25 Sep 2017 04:29), Max: 98 (24 Sep 2017 21:20)  HR: 69 (25 Sep 2017 04:29) (65 - 74)  BP: 128/67 (25 Sep 2017 04:29) (122/72 - 136/68)  BP(mean): --  ABP: --  ABP(mean): --  RR: 16 (25 Sep 2017 04:29) (16 - 16)  SpO2: 94% (25 Sep 2017 04:29) (94% - 95%)        09-23 @ 07:01 - 09-24 @ 07:00  --------------------------------------------------------  IN: 1735 mL / OUT: 700 mL / NET: 1035 mL    09-24 @ 07:01 - 09-25 @ 05:28  --------------------------------------------------------  IN: 1460 mL / OUT: 750 mL / NET: 710 mL      CAPILLARY BLOOD GLUCOSE          PHYSICAL EXAM:NAD in bed  General: Awake, alert, oriented X 3.   HEENT: Atraumatic, normocephalic.                 Mallampatti Grade 3                No nasal congestion.                No tonsillar or pharyngeal exudates.  Lymph Nodes: No palpable lymphadenopathy  Neck: No JVD. No carotid bruit.   Respiratory: Normal chest expansion                         Normal percussion                         Normal and equal air entry                         No wheeze, rhonchi or rales.  Cardiovascular: S1 S2 normal. 1-2+ murmurs, no rubs or gallops.   Abdomen: Soft, non-tender, non-distended. No organomegaly.  Extremities: Warm to touch. Peripheral pulse palpable. No pedal edema.   Skin: No rashes or skin lesions  Neurological: Motor and sensory examination equal and normal in all four extremities.  Psychiatry: Appropriate mood and affect.    HOSPITAL MEDICATIONS:  MEDICATIONS  (STANDING):  rivaroxaban 10 milliGRAM(s) Oral daily  atorvastatin 10 milliGRAM(s) Oral at bedtime  finasteride 5 milliGRAM(s) Oral daily  tamsulosin 0.4 milliGRAM(s) Oral at bedtime  buDESOnide 160 MICROgram(s)/formoterol 4.5 MICROgram(s) Inhaler 2 Puff(s) Inhalation two times a day  tiotropium 18 MICROgram(s) Capsule 1 Capsule(s) Inhalation daily  fluticasone propionate 50 MICROgram(s)/spray Nasal Spray 1 Spray(s) Both Nostrils two times a day  losartan 50 milliGRAM(s) Oral daily  metoprolol succinate ER 25 milliGRAM(s) Oral daily    MEDICATIONS  (PRN):  acetaminophen   Tablet 650 milliGRAM(s) Oral every 6 hours PRN For Temp greater than 38 C (100.4 F)  ondansetron Injectable 4 milliGRAM(s) IV Push every 6 hours PRN Nausea  oxyCODONE    IR 5 milliGRAM(s) Oral every 4 hours PRN Mild Pain (1 - 3)  oxyCODONE    IR 10 milliGRAM(s) Oral every 4 hours PRN Moderate Pain (4 - 6)  HYDROmorphone  Injectable 0.5 milliGRAM(s) IV Push every 4 hours PRN Severe Pain (7 - 10)  ALBUTerol    0.083% 2.5 milliGRAM(s) Nebulizer every 6 hours PRN Shortness of Breath and/or Wheezing  senna 2 Tablet(s) Oral at bedtime PRN Constipation  docusate sodium 100 milliGRAM(s) Oral daily PRN Constipation  benzonatate 100 milliGRAM(s) Oral every 8 hours PRN Cough      LABS:                        9.7    7.89  )-----------( 312      ( 24 Sep 2017 09:04 )             28.9     09-24    126<L>  |  96  |  17  ----------------------------<  98  4.5   |  21<L>  |  0.94    Ca    8.1<L>      24 Sep 2017 08:52                MICROBIOLOGY:     RADIOLOGY:  [ ] Reviewed and interpreted by me    Point of Care Ultrasound Findings:    PFT:    EKG:

## 2017-09-25 NOTE — PROGRESS NOTE ADULT - ASSESSMENT
89y Male with history of CAD S/P AWMI remote with LVEF 20-25%, chronic systolic CHF, AICD, essential hypertension, hyperlipidemia and reactive airways admitted after a mechanical fall with left hip fracture. Exam is c/w PSVT and possible PAF. Hyponatremia overall unchanged.  No CHF on exam.

## 2017-09-25 NOTE — DISCHARGE NOTE ADULT - CARE PLAN
Principal Discharge DX:	Closed fracture of left hip, initial encounter  Goal:	Pain relief, improvement with activities of daily living  Instructions for follow-up, activity and diet:	Please call Dr. Aguilera' s office for a follow up appointment about 14 days after surgery. Dressing change daily til dry for 24 hours. Out of bed, ambulate, weight bearing as tolerated- Physical therapy to assist with exercise and help increase endurance Principal Discharge DX:	Closed fracture of left hip, initial encounter  Goal:	Pain relief, improvement with activities of daily living  Instructions for follow-up, activity and diet:	Please call Dr. Aguilera' s office for a follow up appointment about 14 days after surgery. Dressing change daily til dry for 24 hours. Out of bed, ambulate, weight bearing as tolerated- Physical therapy to assist with exercise and help increase endurance. please follow up with cardiologist as outpatient within 1 week

## 2017-09-25 NOTE — PROGRESS NOTE ADULT - ASSESSMENT
89M s/p L hip IMN; POD5  1. Pain control  2. DVT ppx  3. WBAT/PT/OT/OOB  4. IS  5. Fluid restricted 1200 cc  6. D/C to rehab when accepted    MD Lynn

## 2017-09-25 NOTE — DISCHARGE NOTE ADULT - CARE PROVIDERS DIRECT ADDRESSES
,DirectAddress_Unknown,DirectAddress_Unknown,[email or web address]

## 2017-09-25 NOTE — DISCHARGE NOTE ADULT - MEDICATION SUMMARY - MEDICATIONS TO CHANGE
I will SWITCH the dose or number of times a day I take the medications listed below when I get home from the hospital:  None I will SWITCH the dose or number of times a day I take the medications listed below when I get home from the hospital:    metoprolol succinate 25 mg oral tablet, extended release  -- 1 tab(s) by mouth once a day

## 2017-09-25 NOTE — PROGRESS NOTE ADULT - ASSESSMENT
89 yo M with a h/o asthma, seasonal allergies, CAD, systolic CHF s/p AICD, HTN admitted s/p fall on to left hip with subsequent left hip fracture. Plan for OR for ORIF today.  Asthma history - states he is well controlled, using an Albuterol nebulizer 1-2x a day as needed.  Had an episode of acute bronchitis the end of August and is s/p course of antibiotics and Prednisone.  Currently denies SOB, wheezing or cough.  Not hypoxic, comfortable appearing and speaking in full sentences.  Left hip pain currently controlled     Asthma - mild well controlled      Hyponatremia - unclear etiology, appears euvolemic. Would send urine electrolytes and close monitoring. 89 yo M with a h/o asthma, seasonal allergies, CAD, systolic CHF s/p AICD, HTN admitted s/p fall on to left hip with subsequent left hip fracture. Plan for OR for ORIF today.  Asthma history - states he is well controlled, using an Albuterol nebulizer 1-2x a day as needed.  Had an episode of acute bronchitis the end of August and is s/p course of antibiotics and Prednisone.  Currently denies SOB, wheezing or cough.  Not hypoxic, comfortable appearing and speaking in full sentences.  Left hip pain currently controlled     Asthma - mild well controlled      Hyponatremia - unclear etiology, appears euvolemic.

## 2017-09-25 NOTE — DISCHARGE NOTE ADULT - CARE PROVIDER_API CALL
London Aguilera), Orthopaedic Surgery  [address]  [address]  [place] [address]  Phone: [phone]  Fax: [phone]    Carlo Mcconnell), Cardiovascular Disease; Internal Medicine; Nuclear Cardiology  [address]  [address]  [place], [place] [address]  Phone: [phone]  Fax: [phone]    Norman Coughlin), Internal Medicine; Pulmonary Disease  [address]  [address]  [place] [address]  Phone: [phone]  Fax: [phone]

## 2017-09-25 NOTE — PROGRESS NOTE ADULT - ATTENDING COMMENTS
as above- pulm. stable  pain control  increase ambulation and rehab. pending-(pulm. stable for rehab)    Norman Coughlin MD-Pulmonary   [phone] as above- pulm. stable; cards stable as per Jayesh et al.  pain control  increase ambulation and rehab. pending-(pulm. stable for rehab)    Norman Coughlin MD-Pulmonary   [phone]

## 2017-09-25 NOTE — PROGRESS NOTE ADULT - SUBJECTIVE AND OBJECTIVE BOX
Pt seen and examined; nothing acute overnight; pain controlled    Vital Signs Last 24 Hrs  T(C): 36.5 (25 Sep 2017 04:29), Max: 36.7 (24 Sep 2017 21:20)  T(F): 97.7 (25 Sep 2017 04:29), Max: 98 (24 Sep 2017 21:20)  HR: 69 (25 Sep 2017 04:29) (65 - 74)  BP: 128/67 (25 Sep 2017 04:29) (122/72 - 136/68)  BP(mean): --  RR: 16 (25 Sep 2017 04:29) (16 - 16)  SpO2: 94% (25 Sep 2017 04:29) (94% - 95%)                          9.7    7.89  )-----------( 312      ( 24 Sep 2017 09:04 )             28.9     09-24    126<L>  |  96  |  17  ----------------------------<  98  4.5   |  21<L>  |  0.94    Ca    8.1<L>      24 Sep 2017 08:52    NAD  LLE dressings with S/S drainage, replaced  Incisions c/d/i  +PF/DF  SILT at [facility]
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
## 2017-09-25 NOTE — DISCHARGE NOTE ADULT - MEDICATION SUMMARY - MEDICATIONS TO TAKE
I will START or STAY ON the medications listed below when I get home from the hospital:    finasteride 5 mg oral tablet  -- 1 tab(s) by mouth once a day  -- Indication: For BPH (benign prostatic hypertrophy)    aspirin 81 mg oral tablet  -- 1 tab(s) by mouth once a day  -- Indication: For Antiplatelet therapy    acetaminophen 325 mg oral tablet  -- 2 tab(s) by mouth every 6 hours, As needed, For Temp greater than 38 C (100.4 F)  -- Indication: For Temps    oxyCODONE 5 mg oral tablet  -- 1 or 2 tab(s) by mouth every 4 hours, As needed, for Pain   -- Indication: For Pain    losartan 50 mg oral tablet  -- 1 tab(s) by mouth once a day  -- Indication: For Antihypertensive agent    tamsulosin 0.4 mg oral capsule  -- 1 cap(s) by mouth once a day (at bedtime)  -- Indication: For BPH (benign prostatic hypertrophy)    rivaroxaban 10 mg oral tablet  -- 1 tab(s) by mouth once a day  -- Indication: For Anticoagulation therapy    pravastatin 20 mg oral tablet  -- 1 tab(s) by mouth once a day  -- Indication: For Antihyperlipidemia agent    benzonatate 100 mg oral capsule  -- 1 cap(s) by mouth every 8 hours, As needed, Cough  -- Indication: For Antitussives    metoprolol succinate 25 mg oral tablet, extended release  -- 1 tab(s) by mouth once a day  -- Indication: For Antihypertensive agent    Coreg 3.125 mg oral tablet  -- 1 tab(s) by mouth 2 times a day  -- Indication: For Antihypertensive agent    albuterol 2.5 mg/3 mL (0.083%) inhalation solution  -- 3 milliliter(s) inhaled 2 times a day  -- Indication: For Bronchodilator    tiotropium 18 mcg inhalation capsule  -- 1 cap(s) inhaled once a day  -- Indication: For Bronchodilator    docusate sodium 100 mg oral capsule  -- 1 cap(s) by mouth once a day, As needed, Constipation  -- Indication: For Laxative    azelastine 137 mcg/inh (0.1%) nasal spray  -- 1 spray(s) in each nostril 2 times a day  -- Indication: For Nasal preparation    Artificial Tears ophthalmic solution  -- 1 drop(s) in each eye , As Needed  -- Indication: For Ophthalmic agent    mirabegron 50 mg oral tablet, extended release  -- 1 tab(s) by mouth once a day  -- Indication: For Urinary antispasmotic I will START or STAY ON the medications listed below when I get home from the hospital:    finasteride 5 mg oral tablet  -- 1 tab(s) by mouth once a day  -- Indication: For BPH (benign prostatic hypertrophy)    aspirin 81 mg oral tablet  -- 1 tab(s) by mouth once a day  -- Indication: For Antiplatelet therapy    acetaminophen 325 mg oral tablet  -- 2 tab(s) by mouth every 6 hours, As needed, For Temp greater than 38 C (100.4 F)  -- Indication: For Temps    oxyCODONE 5 mg oral tablet  -- 1 or 2 tab(s) by mouth every 4 hours, As needed, for Pain   -- Indication: For Pain    losartan 50 mg oral tablet  -- 1 tab(s) by mouth once a day  -- Indication: For Antihypertensive agent    tamsulosin 0.4 mg oral capsule  -- 1 cap(s) by mouth once a day (at bedtime)  -- Indication: For BPH (benign prostatic hypertrophy)    rivaroxaban 10 mg oral tablet  -- 1 tab(s) by mouth once a day  -- Indication: For Anticoagulation therapy    pravastatin 20 mg oral tablet  -- 1 tab(s) by mouth once a day  -- Indication: For Antihyperlipidemia agent    benzonatate 100 mg oral capsule  -- 1 cap(s) by mouth every 8 hours, As needed, Cough  -- Indication: For Antitussives    metoprolol succinate 25 mg oral tablet, extended release  -- 1 tab(s) by mouth once a day  -- Indication: For Antihypertensive agent    albuterol 2.5 mg/3 mL (0.083%) inhalation solution  -- 3 milliliter(s) inhaled 2 times a day  -- Indication: For Bronchodilator    tiotropium 18 mcg inhalation capsule  -- 1 cap(s) inhaled once a day  -- Indication: For Bronchodilator    docusate sodium 100 mg oral capsule  -- 1 cap(s) by mouth once a day, As needed, Constipation  -- Indication: For Laxative    azelastine 137 mcg/inh (0.1%) nasal spray  -- 1 spray(s) in each nostril 2 times a day  -- Indication: For Nasal preparation    Artificial Tears ophthalmic solution  -- 1 drop(s) in each eye , As Needed  -- Indication: For Ophthalmic agent    mirabegron 50 mg oral tablet, extended release  -- 1 tab(s) by mouth once a day  -- Indication: For Urinary antispasmotic I will START or STAY ON the medications listed below when I get home from the hospital:    finasteride 5 mg oral tablet  -- 1 tab(s) by mouth once a day  -- Indication: For BPH (benign prostatic hypertrophy)    aspirin 81 mg oral tablet  -- 1 tab(s) by mouth once a day  -- Indication: For Antiplatelet therapy    acetaminophen 325 mg oral tablet  -- 2 tab(s) by mouth every 6 hours, As needed, For Temp greater than 38 C (100.4 F)  -- Indication: For Temps    oxyCODONE 5 mg oral tablet  -- 1 or 2 tab(s) by mouth every 4 hours, As needed, for Pain   -- Indication: For Pain    losartan 50 mg oral tablet  -- 1 tab(s) by mouth once a day  -- Indication: For Antihypertensive agent    tamsulosin 0.4 mg oral capsule  -- 1 cap(s) by mouth once a day (at bedtime)  -- Indication: For BPH (benign prostatic hypertrophy)    Xarelto 15 mg oral tablet  -- 1 tab(s) by mouth every 24 hours  -- Indication: For A-Fib    pravastatin 20 mg oral tablet  -- 1 tab(s) by mouth once a day  -- Indication: For Antihyperlipidemia agent    benzonatate 100 mg oral capsule  -- 1 cap(s) by mouth every 8 hours, As needed, Cough  -- Indication: For Antitussives    metoprolol succinate 50 mg oral tablet, extended release  -- 1 tab(s) by mouth 2 times a day  -- Indication: For A-Fib    tiotropium 18 mcg inhalation capsule  -- 1 cap(s) inhaled once a day  -- Indication: For Bronchodilator    albuterol 2.5 mg/3 mL (0.083%) inhalation solution  -- 3 milliliter(s) inhaled 2 times a day  -- Indication: For Bronchodilator    docusate sodium 100 mg oral capsule  -- 1 cap(s) by mouth once a day, As needed, Constipation  -- Indication: For Laxative    azelastine 137 mcg/inh (0.1%) nasal spray  -- 1 spray(s) in each nostril 2 times a day  -- Indication: For Nasal preparation    Artificial Tears ophthalmic solution  -- 1 drop(s) in each eye , As Needed  -- Indication: For Ophthalmic agent    mirabegron 50 mg oral tablet, extended release  -- 1 tab(s) by mouth once a day  -- Indication: For Urinary antispasmotic I will START or STAY ON the medications listed below when I get home from the hospital:    finasteride 5 mg oral tablet  -- 1 tab(s) by mouth once a day  -- Indication: For BPH (benign prostatic hypertrophy)    aspirin 81 mg oral tablet  -- 1 tab(s) by mouth once a day  -- Indication: For Antiplatelet therapy    acetaminophen 325 mg oral tablet  -- 2 tab(s) by mouth every 6 hours, As needed, For Temp greater than 38 C (100.4 F)  -- Indication: For Temps    oxyCODONE 5 mg oral tablet  -- 1 or 2 tab(s) by mouth every 4 hours, As needed, for Pain   -- Indication: For Pain    losartan 50 mg oral tablet  -- 1 tab(s) by mouth once a day  -- Indication: For Antihypertensive agent    tamsulosin 0.4 mg oral capsule  -- 1 cap(s) by mouth once a day (at bedtime)  -- Indication: For BPH (benign prostatic hypertrophy)    Xarelto 15 mg oral tablet  -- 1 tab(s) by mouth every 24 hours  -- Indication: For A-Fib    pravastatin 20 mg oral tablet  -- 1 tab(s) by mouth once a day  -- Indication: For Antihyperlipidemia agent    benzonatate 100 mg oral capsule  -- 1 cap(s) by mouth every 8 hours, As needed, Cough  -- Indication: For Antitussives    Toprol-XL 25 mg oral tablet, extended release  -- 1 tab(s) by mouth once a day  -- Indication: For Afib    tiotropium 18 mcg inhalation capsule  -- 1 cap(s) inhaled once a day  -- Indication: For Bronchodilator    albuterol 2.5 mg/3 mL (0.083%) inhalation solution  -- 3 milliliter(s) inhaled 2 times a day  -- Indication: For Bronchodilator    docusate sodium 100 mg oral capsule  -- 1 cap(s) by mouth once a day, As needed, Constipation  -- Indication: For Laxative    azelastine 137 mcg/inh (0.1%) nasal spray  -- 1 spray(s) in each nostril 2 times a day  -- Indication: For Nasal preparation    Artificial Tears ophthalmic solution  -- 1 drop(s) in each eye , As Needed  -- Indication: For Ophthalmic agent    mirabegron 50 mg oral tablet, extended release  -- 1 tab(s) by mouth once a day  -- Indication: For Urinary antispasmotic

## 2017-09-25 NOTE — DISCHARGE NOTE ADULT - PLAN OF CARE
Pain relief, improvement with activities of daily living Please call Dr. Aguilera' s office for a follow up appointment about 14 days after surgery. Dressing change daily til dry for 24 hours. Out of bed, ambulate, weight bearing as tolerated- Physical therapy to assist with exercise and help increase endurance Please call Dr. Aguilera' s office for a follow up appointment about 14 days after surgery. Dressing change daily til dry for 24 hours. Out of bed, ambulate, weight bearing as tolerated- Physical therapy to assist with exercise and help increase endurance. please follow up with cardiologist as outpatient within 1 week

## 2017-09-25 NOTE — DISCHARGE NOTE ADULT - NS AS ACTIVITY OBS
Stairs allowed/Do not make important decisions/Walking-Outdoors allowed/Walking-Indoors allowed/keep dressing and wound clean and dry.

## 2017-09-25 NOTE — PROGRESS NOTE ADULT - SUBJECTIVE AND OBJECTIVE BOX
Patient is a 89y old  Male who presents with a chief complaint of Left Hip Pain, IT Fracture/ Fixation with IM Nail. post op course complicated by episode of SVT        MEDICATIONS  (STANDING):  atorvastatin 10 milliGRAM(s) Oral at bedtime  finasteride 5 milliGRAM(s) Oral daily  tamsulosin 0.4 milliGRAM(s) Oral at bedtime  buDESOnide 160 MICROgram(s)/formoterol 4.5 MICROgram(s) Inhaler 2 Puff(s) Inhalation two times a day  tiotropium 18 MICROgram(s) Capsule 1 Capsule(s) Inhalation daily  fluticasone propionate 50 MICROgram(s)/spray Nasal Spray 1 Spray(s) Both Nostrils two times a day  losartan 50 milliGRAM(s) Oral daily  rivaroxaban 15 milliGRAM(s) Oral every 24 hours  metoprolol succinate ER 50 milliGRAM(s) Oral two times a day    MEDICATIONS  (PRN):  acetaminophen   Tablet 650 milliGRAM(s) Oral every 6 hours PRN For Temp greater than 38 C (100.4 F)  ondansetron Injectable 4 milliGRAM(s) IV Push every 6 hours PRN Nausea  oxyCODONE    IR 5 milliGRAM(s) Oral every 4 hours PRN Mild Pain (1 - 3)  oxyCODONE    IR 10 milliGRAM(s) Oral every 4 hours PRN Moderate Pain (4 - 6)  HYDROmorphone  Injectable 0.5 milliGRAM(s) IV Push every 4 hours PRN Severe Pain (7 - 10)  ALBUTerol    0.083% 2.5 milliGRAM(s) Nebulizer every 6 hours PRN Shortness of Breath and/or Wheezing  senna 2 Tablet(s) Oral at bedtime PRN Constipation  docusate sodium 100 milliGRAM(s) Oral daily PRN Constipation  benzonatate 100 milliGRAM(s) Oral every 8 hours PRN Cough      REVIEW OF SYSTEM:  CONSTITUTIONAL: No fever, No change in weight, No fatigue  HEAD: No headache, No dizziness, No recent trauma  EYES: No eye pain, No visual disturbances, No discharge  ENT:  No difficulty hearing, No tinnitus, No vertigo, No sinus pain, No throat pain  NECK: No pain, No stiffness  BREASTS: No pain, No masses, No nipple discharge  RESPIRATORY: No cough, No wheezing, No chills, No hemoptysis, No shortness of breath at rest or exertional shortness of breath  CARDIOVASCULAR: No chest pain, No palpitations, No dizziness, No CHF, No arrhythmia, No cardiomegaly, No leg swelling  GASTROINTESTINAL: No abdominal, No epigastric pain. No nausea, No vomiting, No hematemesis, No diarrhea, No constipation. No melena, No hematochezia. No GERD  GENITOURINARY: No dysuria, No frequency, No hematuria, No incontinence, No nocturia, No hesitancy,  SKIN: No itching, No burning, No rashes, No lesions   LYMPH NODES: No history of enlarged glands  ENDOCRINE: No heat or cold intolerance, No hair loss. No osteoporosis, No thyroid disease  MUSCULOSKELETAL: left hip pain, status post fall and hip fracture  PSYCHIATRIC: No depression, No anxiety, No mood swings, No difficulty sleeping  HEME/LYMPH: No easy bruising, No anticoagulants, No bleeding disorder, No bleeding gums  ALLERGY AND IMMUNOLOGIC: No hives, No eczema  NEUROLOGICAL: No memory loss, No loss of strength, No numbness, No tremors    VITALS:   T(C): 36.7 (09-25-17 @ 20:23), Max: 36.7 (09-25-17 @ 07:42)  HR: 68 (09-25-17 @ 20:23) (68 - 139)  BP: 128/69 (09-25-17 @ 20:23) (115/69 - 131/73)  RR: 18 (09-25-17 @ 20:23) (16 - 18)  SpO2: 95% (09-25-17 @ 20:23) (94% - 95%)  Wt(kg): --    PHYSICAL EXAM:  GENERAL: NAD, well nourished and conversant  HEAD:  Atraumatic  EYES: EOM, PERRLA, conjunctiva pink and sclera white  ENT: No tonsillar erythema, exudates, or enlargement, moist mucous membranes, good dentition, no lesions  NECK: Supple, No JVD, normal thyroid, carotids with normal upstrokes and no bruits  CHEST/LUNG: Clear to auscultation bilaterally, No rales, rhonchi, wheezing, or rubs  HEART: Regular rate and rhythm, No murmurs, rubs, or gallops  ABDOMEN: Soft, nondistended, no masses, guarding, tenderness or rebound, bowel sounds present  EXTREMITIES:  2+ Peripheral Pulses, No clubbing, cyanosis, or edema. No arthritis of shoulders, elbows, hands, knees, ankles, or feet. No DJD C spine, T spine, or L/S spine. 4+ left hip swelling with no redness or discharge.  LYMPH: No lymphadenopathy noted  SKIN: No rashes or lesions  NERVOUS SYSTEM:  Alert & Oriented X3, normal cognitive function. Motor Strength 5/5 right upper and right lower.  5/5 left upper and left lower extremities, DTRs 2+ intact and symmetric      LABS:        CBC Full  -  ( 24 Sep 2017 09:04 )  WBC Count : 7.89 K/uL  Hemoglobin : 9.7 g/dL  Hematocrit : 28.9 %  Platelet Count - Automated : 312 K/uL  Mean Cell Volume : 82.3 fl  Mean Cell Hemoglobin : 27.6 pg  Mean Cell Hemoglobin Concentration : 33.6 gm/dL  Auto Neutrophil # : x  Auto Lymphocyte # : x  Auto Monocyte # : x  Auto Eosinophil # : x  Auto Basophil # : x  Auto Neutrophil % : x  Auto Lymphocyte % : x  Auto Monocyte % : x  Auto Eosinophil % : x  Auto Basophil % : x    09-24    126<L>  |  96  |  17  ----------------------------<  98  4.5   |  21<L>  |  0.94    Ca    8.1<L>      24 Sep 2017 08:52            CAPILLARY BLOOD GLUCOSE          RADIOLOGY & ADDITIONAL TESTS:

## 2017-09-26 DIAGNOSIS — S72.002A FRACTURE OF UNSPECIFIED PART OF NECK OF LEFT FEMUR, INITIAL ENCOUNTER FOR CLOSED FRACTURE: ICD-10-CM

## 2017-09-26 DIAGNOSIS — I48.92 UNSPECIFIED ATRIAL FLUTTER: ICD-10-CM

## 2017-09-26 LAB
ANION GAP SERPL CALC-SCNC: 13 MMOL/L — SIGNIFICANT CHANGE UP (ref 5–17)
BUN SERPL-MCNC: 16 MG/DL — SIGNIFICANT CHANGE UP (ref 7–23)
CALCIUM SERPL-MCNC: 8.3 MG/DL — LOW (ref 8.4–10.5)
CHLORIDE SERPL-SCNC: 93 MMOL/L — LOW (ref 96–108)
CO2 SERPL-SCNC: 22 MMOL/L — SIGNIFICANT CHANGE UP (ref 22–31)
CREAT SERPL-MCNC: 0.84 MG/DL — SIGNIFICANT CHANGE UP (ref 0.5–1.3)
GLUCOSE SERPL-MCNC: 110 MG/DL — HIGH (ref 70–99)
HCT VFR BLD CALC: 30 % — LOW (ref 39–50)
HGB BLD-MCNC: 9.8 G/DL — LOW (ref 13–17)
MCHC RBC-ENTMCNC: 27.2 PG — SIGNIFICANT CHANGE UP (ref 27–34)
MCHC RBC-ENTMCNC: 32.7 GM/DL — SIGNIFICANT CHANGE UP (ref 32–36)
MCV RBC AUTO: 83.3 FL — SIGNIFICANT CHANGE UP (ref 80–100)
PLATELET # BLD AUTO: 370 K/UL — SIGNIFICANT CHANGE UP (ref 150–400)
POTASSIUM SERPL-MCNC: 5.6 MMOL/L — HIGH (ref 3.5–5.3)
POTASSIUM SERPL-SCNC: 5.6 MMOL/L — HIGH (ref 3.5–5.3)
RBC # BLD: 3.6 M/UL — LOW (ref 4.2–5.8)
RBC # FLD: 16.9 % — HIGH (ref 10.3–14.5)
SODIUM SERPL-SCNC: 128 MMOL/L — LOW (ref 135–145)
WBC # BLD: 9.37 K/UL — SIGNIFICANT CHANGE UP (ref 3.8–10.5)
WBC # FLD AUTO: 9.37 K/UL — SIGNIFICANT CHANGE UP (ref 3.8–10.5)

## 2017-09-26 PROCEDURE — 99232 SBSQ HOSP IP/OBS MODERATE 35: CPT

## 2017-09-26 PROCEDURE — 99223 1ST HOSP IP/OBS HIGH 75: CPT | Mod: AI

## 2017-09-26 RX ORDER — SODIUM CHLORIDE 9 MG/ML
1000 INJECTION INTRAMUSCULAR; INTRAVENOUS; SUBCUTANEOUS
Qty: 0 | Refills: 0 | Status: DISCONTINUED | OUTPATIENT
Start: 2017-09-26 | End: 2017-09-29

## 2017-09-26 RX ORDER — METOPROLOL TARTRATE 50 MG
1 TABLET ORAL
Qty: 0 | Refills: 0 | COMMUNITY
Start: 2017-09-26

## 2017-09-26 RX ORDER — FONDAPARINUX SODIUM 2.5 MG/.5ML
1 INJECTION, SOLUTION SUBCUTANEOUS
Qty: 0 | Refills: 0 | COMMUNITY
Start: 2017-09-26

## 2017-09-26 RX ORDER — METOPROLOL TARTRATE 50 MG
1 TABLET ORAL
Qty: 0 | Refills: 0 | COMMUNITY

## 2017-09-26 RX ORDER — RIVAROXABAN 15 MG-20MG
20 KIT ORAL EVERY 24 HOURS
Qty: 0 | Refills: 0 | Status: DISCONTINUED | OUTPATIENT
Start: 2017-09-26 | End: 2017-09-29

## 2017-09-26 RX ADMIN — TAMSULOSIN HYDROCHLORIDE 0.4 MILLIGRAM(S): 0.4 CAPSULE ORAL at 21:15

## 2017-09-26 RX ADMIN — ALBUTEROL 2.5 MILLIGRAM(S): 90 AEROSOL, METERED ORAL at 15:15

## 2017-09-26 RX ADMIN — BUDESONIDE AND FORMOTEROL FUMARATE DIHYDRATE 2 PUFF(S): 160; 4.5 AEROSOL RESPIRATORY (INHALATION) at 17:25

## 2017-09-26 RX ADMIN — RIVAROXABAN 20 MILLIGRAM(S): KIT at 17:29

## 2017-09-26 RX ADMIN — Medication 1 SPRAY(S): at 17:30

## 2017-09-26 RX ADMIN — Medication 50 MILLIGRAM(S): at 05:26

## 2017-09-26 RX ADMIN — Medication 50 MILLIGRAM(S): at 17:25

## 2017-09-26 RX ADMIN — ATORVASTATIN CALCIUM 10 MILLIGRAM(S): 80 TABLET, FILM COATED ORAL at 21:15

## 2017-09-26 RX ADMIN — BUDESONIDE AND FORMOTEROL FUMARATE DIHYDRATE 2 PUFF(S): 160; 4.5 AEROSOL RESPIRATORY (INHALATION) at 05:26

## 2017-09-26 RX ADMIN — Medication 1 SPRAY(S): at 05:26

## 2017-09-26 RX ADMIN — LOSARTAN POTASSIUM 50 MILLIGRAM(S): 100 TABLET, FILM COATED ORAL at 05:25

## 2017-09-26 RX ADMIN — TIOTROPIUM BROMIDE 1 CAPSULE(S): 18 CAPSULE ORAL; RESPIRATORY (INHALATION) at 11:36

## 2017-09-26 RX ADMIN — FINASTERIDE 5 MILLIGRAM(S): 5 TABLET, FILM COATED ORAL at 11:36

## 2017-09-26 NOTE — CONSULT NOTE ADULT - CONSULT REQUESTED DATE/TIME
19-Sep-2017 20:13
19-Sep-2017 20:22
20-Sep-2017 09:30
20-Sep-2017 12:00
26-Sep-2017 16:02
20-Sep-2017 06:21

## 2017-09-26 NOTE — CONSULT NOTE ADULT - CONSULT REASON
Atrial flutter
Medical evaluation pre-op
Medical evaluation pre-op
Postop hypotension
Pre-operative pulmonary evaluation    Pulmonary consultation on behalf of Dr. Norman Coughlin
Cardiac clearance

## 2017-09-26 NOTE — PROGRESS NOTE ADULT - ASSESSMENT
Patient with hip fracture treated with locking intramedullary jay jay. Patient is feeling well a lttile tired and now needs to get back on his feet Denies chest pain SoB, palpitations. Recovery and ambulation compromised by her advanced age.  On postoperative xarelto for hip fracture, plus chronic atrial fibrillation. Chronic hyponatremia, of no clinical significance, at present. Patient with hip fracture treated with locking intramedullary jay jay. Patient is feeling well a lttile tired and now needs to get back on his feet Denies chest pain SoB, palpitations. Recovery and ambulation compromised by her advanced age. Was on postoperative xarelto for hip fracture, plus chronic atrial fibrillation, held for a possible cardiac electrical versus chemical cardioversion.. Chronic hyponatremia, of no clinical significance, at present.  Xarelto held for possible cardioversion.

## 2017-09-26 NOTE — PROGRESS NOTE ADULT - ASSESSMENT
89y Male with history of CAD S/P AWMI remote with LVEF 20-25%, chronic systolic CHF, AICD, essential hypertension, hyperlipidemia and reactive airways admitted after a mechanical fall with left hip fracture. EKG yestrerday c/w NEW ONSET atrial flutter/fibrillation. On telemetry with rate improved underlying rhtyhm atrial flutter. Concerned about risk of rapid ventricular response with underlying atrial flutter and possible AICD discharges when patient more active at [facility] [place]. Will ask EP to evaluate and consider D/C cardioversion with KAREN guidance, atrial flutter ablation vs. observation.  Hyponatremia unchanged.  No CHF on exam. Will increase xarelto 20mg per day.

## 2017-09-26 NOTE — PROGRESS NOTE ADULT - ATTENDING COMMENTS
as above- pulm. stable; cards stable as per Jayesh et al.  pain control  increase ambulation and rehab. pending-(pulm. stable for rehab)    Norman Coughlin MD-Pulmonary   [phone] as above- pulm. stable; cards stable as per Jayesh et al.-moved to monitored bed--rhythm appears stable at present  pain control  increase ambulation and rehab. pending-(pulm. stable for rehab)    Norman Coughlin MD-Pulmonary   [phone]

## 2017-09-26 NOTE — PROGRESS NOTE ADULT - SUBJECTIVE AND OBJECTIVE BOX
CHIEF COMPLAINT:    Interval Events:    REVIEW OF SYSTEMS:  Constitutional: No fevers or chills. No weight loss. No fatigue or generalized malaise.  Eyes: No itching or discharge from the eyes  ENT: No ear pain. No ear discharge. No nasal congestion. No post nasal drip. No epistaxis. No throat pain. No sore throat. No difficulty swallowing.   CV: No chest pain. No palpitations. No lightheadedness or dizziness.   Resp: No dyspnea at rest. No dyspnea on exertion. No orthopnea. No wheezing. No cough. No stridor. No sputum production. No chest pain with respiration.  GI: No nausea. No vomiting. No diarrhea.  MSK: No joint pain or pain in any extremities  Integumentary: No skin lesions. No pedal edema.  Neurological: No gross motor weakness. No sensory changes.  [ ] All other systems negative  [ ] Unable to assess ROS because ________    OBJECTIVE:  ICU Vital Signs Last 24 Hrs  T(C): 36.6 (26 Sep 2017 04:43), Max: 36.7 (25 Sep 2017 07:42)  T(F): 97.9 (26 Sep 2017 04:43), Max: 98.1 (25 Sep 2017 07:42)  HR: 67 (26 Sep 2017 04:43) (67 - 139)  BP: 129/74 (26 Sep 2017 04:43) (115/69 - 129/74)  BP(mean): --  ABP: --  ABP(mean): --  RR: 18 (26 Sep 2017 04:43) (16 - 18)  SpO2: 95% (26 Sep 2017 04:43) (94% - 95%)        09-24 @ 07:01 - 09-25 @ 07:00  --------------------------------------------------------  IN: 1460 mL / OUT: 750 mL / NET: 710 mL    09-25 @ 07:01  -  09-26 @ 05:08  --------------------------------------------------------  IN: 220 mL / OUT: 480 mL / NET: -260 mL      CAPILLARY BLOOD GLUCOSE          PHYSICAL EXAM:  General: Awake, alert, oriented X 3.   HEENT: Atraumatic, normocephalic.                 Mallampatti Grade                 No nasal congestion.                No tonsillar or pharyngeal exudates.  Lymph Nodes: No palpable lymphadenopathy  Neck: No JVD. No carotid bruit.   Respiratory: Normal chest expansion                         Normal percussion                         Normal and equal air entry                         No wheeze, rhonchi or rales.  Cardiovascular: S1 S2 normal. No murmurs, rubs or gallops.   Abdomen: Soft, non-tender, non-distended. No organomegaly.  Extremities: Warm to touch. Peripheral pulse palpable. No pedal edema.   Skin: No rashes or skin lesions  Neurological: Motor and sensory examination equal and normal in all four extremities.  Psychiatry: Appropriate mood and affect.    HOSPITAL MEDICATIONS:  MEDICATIONS  (STANDING):  atorvastatin 10 milliGRAM(s) Oral at bedtime  finasteride 5 milliGRAM(s) Oral daily  tamsulosin 0.4 milliGRAM(s) Oral at bedtime  buDESOnide 160 MICROgram(s)/formoterol 4.5 MICROgram(s) Inhaler 2 Puff(s) Inhalation two times a day  tiotropium 18 MICROgram(s) Capsule 1 Capsule(s) Inhalation daily  fluticasone propionate 50 MICROgram(s)/spray Nasal Spray 1 Spray(s) Both Nostrils two times a day  losartan 50 milliGRAM(s) Oral daily  rivaroxaban 15 milliGRAM(s) Oral every 24 hours  metoprolol succinate ER 50 milliGRAM(s) Oral two times a day    MEDICATIONS  (PRN):  acetaminophen   Tablet 650 milliGRAM(s) Oral every 6 hours PRN For Temp greater than 38 C (100.4 F)  ondansetron Injectable 4 milliGRAM(s) IV Push every 6 hours PRN Nausea  oxyCODONE    IR 5 milliGRAM(s) Oral every 4 hours PRN Mild Pain (1 - 3)  oxyCODONE    IR 10 milliGRAM(s) Oral every 4 hours PRN Moderate Pain (4 - 6)  HYDROmorphone  Injectable 0.5 milliGRAM(s) IV Push every 4 hours PRN Severe Pain (7 - 10)  ALBUTerol    0.083% 2.5 milliGRAM(s) Nebulizer every 6 hours PRN Shortness of Breath and/or Wheezing  senna 2 Tablet(s) Oral at bedtime PRN Constipation  docusate sodium 100 milliGRAM(s) Oral daily PRN Constipation  benzonatate 100 milliGRAM(s) Oral every 8 hours PRN Cough      LABS:                        9.7    7.89  )-----------( 312      ( 24 Sep 2017 09:04 )             28.9     09-24    126<L>  |  96  |  17  ----------------------------<  98  4.5   |  21<L>  |  0.94    Ca    8.1<L>      24 Sep 2017 08:52                MICROBIOLOGY:     RADIOLOGY:  [ ] Reviewed and interpreted by me    Point of Care Ultrasound Findings:    PFT:    EKG: CHIEF COMPLAINT:no cough or sob no pain    Interval Events:monitored bed-no arrythmia noted o/n    REVIEW OF SYSTEMS:  Constitutional: No fevers or chills. No weight loss. No fatigue or generalized malaise.  Eyes: No itching or discharge from the eyes  ENT: No ear pain. No ear discharge. No nasal congestion. No post nasal drip. No epistaxis. No throat pain. No sore throat. No difficulty swallowing.   CV: No chest pain. No palpitations. No lightheadedness or dizziness.   Resp: No dyspnea at rest. No dyspnea on exertion. No orthopnea. No wheezing. No cough. No stridor. No sputum production. No chest pain with respiration.  GI: No nausea. No vomiting. No diarrhea.  MSK: No joint pain or pain in any extremities  Integumentary: No skin lesions. No pedal edema.  Neurological: No gross motor weakness. No sensory changes.  [+ ] All other systems negative  [ ] Unable to assess ROS because ________    OBJECTIVE:  ICU Vital Signs Last 24 Hrs  T(C): 36.6 (26 Sep 2017 04:43), Max: 36.7 (25 Sep 2017 07:42)  T(F): 97.9 (26 Sep 2017 04:43), Max: 98.1 (25 Sep 2017 07:42)  HR: 67 (26 Sep 2017 04:43) (67 - 139)  BP: 129/74 (26 Sep 2017 04:43) (115/69 - 129/74)  BP(mean): --  ABP: --  ABP(mean): --  RR: 18 (26 Sep 2017 04:43) (16 - 18)  SpO2: 95% (26 Sep 2017 04:43) (94% - 95%)        09-24 @ 07:01  -  09-25 @ 07:00  --------------------------------------------------------  IN: 1460 mL / OUT: 750 mL / NET: 710 mL    09-25 @ 07:01  -  09-26 @ 05:08  --------------------------------------------------------  IN: 220 mL / OUT: 480 mL / NET: -260 mL      CAPILLARY BLOOD GLUCOSE          PHYSICAL EXAM:NAD in bed  General: Awake, alert, oriented X 3.   HEENT: Atraumatic, normocephalic.                 Mallampatti Grade 2                No nasal congestion.                No tonsillar or pharyngeal exudates.  Lymph Nodes: No palpable lymphadenopathy  Neck: No JVD. No carotid bruit.   Respiratory: Normal chest expansion                         Normal percussion                         Normal and equal air entry                         No wheeze, rhonchi or rales.  Cardiovascular: S1 S2 normal. No murmurs, rubs or gallops.   Abdomen: Soft, non-tender, non-distended. No organomegaly.  Extremities: Warm to touch. Peripheral pulse palpable. No pedal edema.   Skin: No rashes or skin lesions  Neurological: Motor and sensory examination equal and normal in all four extremities.  Psychiatry: Appropriate mood and affect.    HOSPITAL MEDICATIONS:  MEDICATIONS  (STANDING):  atorvastatin 10 milliGRAM(s) Oral at bedtime  finasteride 5 milliGRAM(s) Oral daily  tamsulosin 0.4 milliGRAM(s) Oral at bedtime  buDESOnide 160 MICROgram(s)/formoterol 4.5 MICROgram(s) Inhaler 2 Puff(s) Inhalation two times a day  tiotropium 18 MICROgram(s) Capsule 1 Capsule(s) Inhalation daily  fluticasone propionate 50 MICROgram(s)/spray Nasal Spray 1 Spray(s) Both Nostrils two times a day  losartan 50 milliGRAM(s) Oral daily  rivaroxaban 15 milliGRAM(s) Oral every 24 hours  metoprolol succinate ER 50 milliGRAM(s) Oral two times a day    MEDICATIONS  (PRN):  acetaminophen   Tablet 650 milliGRAM(s) Oral every 6 hours PRN For Temp greater than 38 C (100.4 F)  ondansetron Injectable 4 milliGRAM(s) IV Push every 6 hours PRN Nausea  oxyCODONE    IR 5 milliGRAM(s) Oral every 4 hours PRN Mild Pain (1 - 3)  oxyCODONE    IR 10 milliGRAM(s) Oral every 4 hours PRN Moderate Pain (4 - 6)  HYDROmorphone  Injectable 0.5 milliGRAM(s) IV Push every 4 hours PRN Severe Pain (7 - 10)  ALBUTerol    0.083% 2.5 milliGRAM(s) Nebulizer every 6 hours PRN Shortness of Breath and/or Wheezing  senna 2 Tablet(s) Oral at bedtime PRN Constipation  docusate sodium 100 milliGRAM(s) Oral daily PRN Constipation  benzonatate 100 milliGRAM(s) Oral every 8 hours PRN Cough      LABS:                        9.7    7.89  )-----------( 312      ( 24 Sep 2017 09:04 )             28.9     09-24    126<L>  |  96  |  17  ----------------------------<  98  4.5   |  21<L>  |  0.94    Ca    8.1<L>      24 Sep 2017 08:52                MICROBIOLOGY:     RADIOLOGY:  [ ] Reviewed and interpreted by me    Point of Care Ultrasound Findings:    PFT:    EKG:

## 2017-09-26 NOTE — CONSULT NOTE ADULT - SUBJECTIVE AND OBJECTIVE BOX
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  REVIEW OF SYSTEMS:    EYES/ENT: No visual changes;  No dysphagia  RESPIRATORY: No cough, wheezing, hemoptysis; No shortness of breath  CARDIOVASCULAR: No chest pain or palpitations; No lower extremity edema  GASTROINTESTINAL: No abdominal or epigastric pain. No nausea, vomiting, or hematemesis; No diarrhea or constipation. No melena or hematochezia.  GENITOURINARY: No dysuria, frequency or hematuria  NEUROLOGICAL: No numbness or weakness  SKIN: No itching, burning, rashes, or lesions   All other review of systems is negative unless indicated above.    Physical Exam:  T(F): 97.9 (09-26), Max: 98.1 (09-25)  HR: 70 (09-26) (67 - 70)  BP: 122/72 (09-26) (122/72 - 129/74)  RR: 18 (09-26)  SpO2: 96% (09-26)  General Appearance: 	 Alert, cooperative  HEENT: normocephalic, atraumatic  Neck: MMM  Lungs:  clear to auscultation and percussion bilaterally anteriorly  CV:  irregular rate and rhythm, S1 normal intensity, S2 normal intensity, no gallops  Abdomen: soft, non-tender; bowel sounds normal; no masses,  no organomegaly  Extremities: without cyanosis, clubbing or edema    Cardiovascular Diagnostic Testing:    ECG: Personally reviewed  Atrial flutter    Interpretation of Telemetry:  A flutter HR 60-80, 4 beats of NSVT    Labs: Personally reviewed                        9.8    9.37  )-----------( 370      ( 26 Sep 2017 08:00 )             30.0     09-26    128<L>  |  93<L>  |  16  ----------------------------<  110<H>  5.6<H>   |  22  |  0.84    Ca    8.3<L>      26 Sep 2017 07:59    A/P:  89 yo M with CAD S/P AWMI remote with LVEF 20-25%, chronic systolic CHF, AICD, essential hypertension, hyperlipidemia and reactive airways admitted after a mechanical fall with left hip fracture developed atrial flutter 9/25.    #Atrial Flutter. New onset after surgery (9/20). Has reported never had this issue in the past.  - Will talk to attending regarding CV with KAREN tomorrow; please keep NPO after midnight  - Zhen 4; will need to cont AC after CV

## 2017-09-26 NOTE — PROGRESS NOTE ADULT - SUBJECTIVE AND OBJECTIVE BOX
Pt seen and examined; pain controlled, transferred to tele yesterday for arrythmia, stable overnight    NAD  LLE dressings with S/S drainage, replaced today  Incisions c/d/i  +PF/DF  SILT at foot  WWP        89M s/p L hip IMN; POD6  1. Pain control  2. DVT ppx  3. WBAT/PT/OT/OOB  4. IS  5. Fluid restricted 1200 cc  6. fu Cards recs

## 2017-09-26 NOTE — PROGRESS NOTE ADULT - ASSESSMENT
89 yo M with a h/o asthma, seasonal allergies, CAD, systolic CHF s/p AICD, HTN admitted s/p fall on to left hip with subsequent left hip fracture. Plan for OR for ORIF today.  Asthma history - states he is well controlled, using an Albuterol nebulizer 1-2x a day as needed.  Had an episode of acute bronchitis the end of August and is s/p course of antibiotics and Prednisone.  Currently denies SOB, wheezing or cough.  Not hypoxic, comfortable appearing and speaking in full sentences.  Left hip pain currently controlled     Asthma - mild well controlled      Hyponatremia - unclear etiology, appears euvolemic.

## 2017-09-26 NOTE — PROGRESS NOTE ADULT - SUBJECTIVE AND OBJECTIVE BOX
Patient is a 89y old  Male who presents with a chief complaint of Left Hip Pain, IT Fracture/ Fixation with IM Nail. post op course complicated by episode of SVT        MEDICATIONS  (STANDING):  atorvastatin 10 milliGRAM(s) Oral at bedtime  finasteride 5 milliGRAM(s) Oral daily  tamsulosin 0.4 milliGRAM(s) Oral at bedtime  buDESOnide 160 MICROgram(s)/formoterol 4.5 MICROgram(s) Inhaler 2 Puff(s) Inhalation two times a day  tiotropium 18 MICROgram(s) Capsule 1 Capsule(s) Inhalation daily  fluticasone propionate 50 MICROgram(s)/spray Nasal Spray 1 Spray(s) Both Nostrils two times a day  losartan 50 milliGRAM(s) Oral daily  rivaroxaban 15 milliGRAM(s) Oral every 24 hours  metoprolol succinate ER 50 milliGRAM(s) Oral two times a day    MEDICATIONS  (PRN):  acetaminophen   Tablet 650 milliGRAM(s) Oral every 6 hours PRN For Temp greater than 38 C (100.4 F)  ondansetron Injectable 4 milliGRAM(s) IV Push every 6 hours PRN Nausea  oxyCODONE    IR 5 milliGRAM(s) Oral every 4 hours PRN Mild Pain (1 - 3)  oxyCODONE    IR 10 milliGRAM(s) Oral every 4 hours PRN Moderate Pain (4 - 6)  HYDROmorphone  Injectable 0.5 milliGRAM(s) IV Push every 4 hours PRN Severe Pain (7 - 10)  ALBUTerol    0.083% 2.5 milliGRAM(s) Nebulizer every 6 hours PRN Shortness of Breath and/or Wheezing  senna 2 Tablet(s) Oral at bedtime PRN Constipation  docusate sodium 100 milliGRAM(s) Oral daily PRN Constipation  benzonatate 100 milliGRAM(s) Oral every 8 hours PRN Cough      REVIEW OF SYSTEM:  CONSTITUTIONAL: No fever, No change in weight, No fatigue  HEAD: No headache, No dizziness, No recent trauma  EYES: No eye pain, No visual disturbances, No discharge  ENT:  No difficulty hearing, No tinnitus, No vertigo, No sinus pain, No throat pain  NECK: No pain, No stiffness  BREASTS: No pain, No masses, No nipple discharge  RESPIRATORY: No cough, No wheezing, No chills, No hemoptysis, No shortness of breath at rest or exertional shortness of breath  CARDIOVASCULAR: No chest pain, No palpitations, No dizziness, No CHF, No arrhythmia, No cardiomegaly, No leg swelling  GASTROINTESTINAL: No abdominal, No epigastric pain. No nausea, No vomiting, No hematemesis, No diarrhea, No constipation. No melena, No hematochezia. No GERD  GENITOURINARY: No dysuria, No frequency, No hematuria, No incontinence, No nocturia, No hesitancy,  SKIN: No itching, No burning, No rashes, No lesions   LYMPH NODES: No history of enlarged glands  ENDOCRINE: No heat or cold intolerance, No hair loss. No osteoporosis, No thyroid disease  MUSCULOSKELETAL: left hip pain, status post fall and hip fracture  PSYCHIATRIC: No depression, No anxiety, No mood swings, No difficulty sleeping  HEME/LYMPH: No easy bruising, No anticoagulants, No bleeding disorder, No bleeding gums  ALLERGY AND IMMUNOLOGIC: No hives, No eczema  NEUROLOGICAL: No memory loss, No loss of strength, No numbness, No tremors    VITALS:   T(C): 36.7 (09-25-17 @ 20:23), Max: 36.7 (09-25-17 @ 07:42)  HR: 68 (09-25-17 @ 20:23) (68 - 139)  BP: 128/69 (09-25-17 @ 20:23) (115/69 - 131/73)  RR: 18 (09-25-17 @ 20:23) (16 - 18)  SpO2: 95% (09-25-17 @ 20:23) (94% - 95%)  Wt(kg): --    PHYSICAL EXAM:  GENERAL: NAD, well nourished and conversant  HEAD:  Atraumatic  EYES: EOM, PERRLA, conjunctiva pink and sclera white  ENT: No tonsillar erythema, exudates, or enlargement, moist mucous membranes, good dentition, no lesions  NECK: Supple, No JVD, normal thyroid, carotids with normal upstrokes and no bruits  CHEST/LUNG: Clear to auscultation bilaterally, No rales, rhonchi, wheezing, or rubs  HEART: Regular rate and rhythm, No murmurs, rubs, or gallops  ABDOMEN: Soft, nondistended, no masses, guarding, tenderness or rebound, bowel sounds present  EXTREMITIES:  2+ Peripheral Pulses, No clubbing, cyanosis, or edema. No arthritis of shoulders, elbows, hands, knees, ankles, or feet. No DJD C spine, T spine, or L/S spine. 4+ left hip swelling with no redness or discharge.  LYMPH: No lymphadenopathy noted  SKIN: No rashes or lesions  NERVOUS SYSTEM:  Alert & Oriented X3, normal cognitive function. Motor Strength 5/5 right upper and right lower.  5/5 left upper and left lower extremities, DTRs 2+ intact and symmetric      LABS:        CBC Full  -  ( 24 Sep 2017 09:04 )  WBC Count : 7.89 K/uL  Hemoglobin : 9.7 g/dL  Hematocrit : 28.9 %  Platelet Count - Automated : 312 K/uL  Mean Cell Volume : 82.3 fl  Mean Cell Hemoglobin : 27.6 pg  Mean Cell Hemoglobin Concentration : 33.6 gm/dL  Auto Neutrophil # : x  Auto Lymphocyte # : x  Auto Monocyte # : x  Auto Eosinophil # : x  Auto Basophil # : x  Auto Neutrophil % : x  Auto Lymphocyte % : x  Auto Monocyte % : x  Auto Eosinophil % : x  Auto Basophil % : x    09-24    126<L>  |  96  |  17  ----------------------------<  98  4.5   |  21<L>  |  0.94    Ca    8.1<L>      24 Sep 2017 08:52            CAPILLARY BLOOD GLUCOSE          RADIOLOGY & ADDITIONAL TESTS: Patient is a 89y old  Male who presents with a chief complaint of Left Hip Pain, IT Fracture/ Fixation with IM Nail. post op course complicated by episode of SVT    MEDICATIONS  (STANDING):  atorvastatin 10 milliGRAM(s) Oral at bedtime  finasteride 5 milliGRAM(s) Oral daily  tamsulosin 0.4 milliGRAM(s) Oral at bedtime  buDESOnide 160 MICROgram(s)/formoterol 4.5 MICROgram(s) Inhaler 2 Puff(s) Inhalation two times a day  tiotropium 18 MICROgram(s) Capsule 1 Capsule(s) Inhalation daily  fluticasone propionate 50 MICROgram(s)/spray Nasal Spray 1 Spray(s) Both Nostrils two times a day  losartan 50 milliGRAM(s) Oral daily  metoprolol succinate ER 50 milliGRAM(s) Oral two times a day  rivaroxaban 20 milliGRAM(s) Oral every 24 hours  sodium chloride 0.9%. 1000 milliLiter(s) (50 mL/Hr) IV Continuous <Continuous>    MEDICATIONS  (PRN):  acetaminophen   Tablet 650 milliGRAM(s) Oral every 6 hours PRN For Temp greater than 38 C (100.4 F)  ondansetron Injectable 4 milliGRAM(s) IV Push every 6 hours PRN Nausea  oxyCODONE    IR 5 milliGRAM(s) Oral every 4 hours PRN Mild Pain (1 - 3)  oxyCODONE    IR 10 milliGRAM(s) Oral every 4 hours PRN Moderate Pain (4 - 6)  HYDROmorphone  Injectable 0.5 milliGRAM(s) IV Push every 4 hours PRN Severe Pain (7 - 10)  ALBUTerol    0.083% 2.5 milliGRAM(s) Nebulizer every 6 hours PRN Shortness of Breath and/or Wheezing  senna 2 Tablet(s) Oral at bedtime PRN Constipation  docusate sodium 100 milliGRAM(s) Oral daily PRN Constipation  benzonatate 100 milliGRAM(s) Oral every 8 hours PRN Cough          REVIEW OF SYSTEM:  CONSTITUTIONAL: No fever, No change in weight, No fatigue  HEAD: No headache, No dizziness, No recent trauma  EYES: No eye pain, No visual disturbances, No discharge  ENT:  No difficulty hearing, No tinnitus, No vertigo, No sinus pain, No throat pain  NECK: No pain, No stiffness  BREASTS: No pain, No masses, No nipple discharge  RESPIRATORY: No cough, No wheezing, No chills, No hemoptysis, No shortness of breath at rest or exertional shortness of breath  CARDIOVASCULAR: No chest pain, No palpitations, No dizziness, No CHF, No arrhythmia, No cardiomegaly, No leg swelling  GASTROINTESTINAL: No abdominal, No epigastric pain. No nausea, No vomiting, No hematemesis, No diarrhea, No constipation. No melena, No hematochezia. No GERD  GENITOURINARY: No dysuria, No frequency, No hematuria, No incontinence, No nocturia, No hesitancy,  SKIN: No itching, No burning, No rashes, No lesions   LYMPH NODES: No history of enlarged glands  ENDOCRINE: No heat or cold intolerance, No hair loss. No osteoporosis, No thyroid disease  MUSCULOSKELETAL: left hip pain, status post fall and hip fracture  PSYCHIATRIC: No depression, No anxiety, No mood swings, No difficulty sleeping  HEME/LYMPH: No easy bruising, No anticoagulants, No bleeding disorder, No bleeding gums  ALLERGY AND IMMUNOLOGIC: No hives, No eczema  NEUROLOGICAL: No memory loss, No loss of strength, No numbness, No tremors    Vital Signs Last 24 Hrs  T(C): 36.3 (26 Sep 2017 20:01), Max: 36.6 (26 Sep 2017 04:43)  T(F): 97.4 (26 Sep 2017 20:01), Max: 97.9 (26 Sep 2017 04:43)  HR: 71 (26 Sep 2017 20:01) (67 - 71)  BP: 119/95 (26 Sep 2017 20:01) (119/95 - 130/78)  BP(mean): --  RR: 20 (26 Sep 2017 20:01) (18 - 20)  SpO2: 95% (26 Sep 2017 20:01) (95% - 96%)      PHYSICAL EXAM:  GENERAL: NAD, well nourished and conversant  HEAD:  Atraumatic  EYES: EOM, PERRLA, conjunctiva pink and sclera white  ENT: No tonsillar erythema, exudates, or enlargement, moist mucous membranes, good dentition, no lesions  NECK: Supple, No JVD, normal thyroid, carotids with normal upstrokes and no bruits  CHEST/LUNG: Clear to auscultation bilaterally, No rales, rhonchi, wheezing, or rubs  HEART: Regular rate and rhythm, No murmurs, rubs, or gallops  ABDOMEN: Soft, nondistended, no masses, guarding, tenderness or rebound, bowel sounds present  EXTREMITIES:  2+ Peripheral Pulses, No clubbing, cyanosis, or edema. No arthritis of shoulders, elbows, hands, knees, ankles, or feet. No DJD C spine, T spine, or L/S spine. 4+ left hip swelling with no redness or discharge.  LYMPH: No lymphadenopathy noted  SKIN: No rashes or lesions  NERVOUS SYSTEM:  Alert & Oriented X3, normal cognitive function. Motor Strength 5/5 right upper and right lower.  5/5 left upper and left lower extremities, DTRs 2+ intact and symmetric          CBC Full  -  ( 26 Sep 2017 08:00 )  WBC Count : 9.37 K/uL  Hemoglobin : 9.8 g/dL  Hematocrit : 30.0 %  Platelet Count - Automated : 370 K/uL  Mean Cell Volume : 83.3 fl  Mean Cell Hemoglobin : 27.2 pg  Mean Cell Hemoglobin Concentration : 32.7 gm/dL  Auto Neutrophil # : x  Auto Lymphocyte # : x  Auto Monocyte # : x  Auto Eosinophil # : x  Auto Basophil # : x  Auto Neutrophil % : x  Auto Lymphocyte % : x  Auto Monocyte % : x  Auto Eosinophil % : x  Auto Basophil % : x    09-26    128<L>  |  93<L>  |  16  ----------------------------<  110<H>  5.6<H>   |  22  |  0.84    Ca    8.3<L>      26 Sep 2017 07:59                              RADIOLOGY & ADDITIONAL TESTS: Patient is a 89y old  Male who presents with a chief complaint of Left Hip Pain, IT Fracture/ Fixation with IM Nail. post op course complicated by episode of SVT.  Presently on atrial flutter with a 4:1 block.  He remains asymptomatic with new arrhythmia. However, increased dose of 50 mg Lopressor is causing the patient to be with an acute asthmatic exacerbation, He now is coughing with increased chest congestion and wheezing. Dr Coughlin, Pulmonary has given him Albuterol via HHN q 6 hours prn, Symbicort viia MDI BID and Budesonide oral and nasal BID, with little improvement to date. Patient was seen by [facility] cardiology and is being considered for cardioversion. He remains off anticoagulation at present.      atorvastatin 10 milliGRAM(s) Oral at bedtime  finasteride 5 milliGRAM(s) Oral daily  tamsulosin 0.4 milliGRAM(s) Oral at bedtime  buDESOnide 160 MICROgram(s)/formoterol 4.5 MICROgram(s) Inhaler 2 Puff(s) Inhalation two times a day  tiotropium 18 MICROgram(s) Capsule 1 Capsule(s) Inhalation daily  fluticasone propionate 50 MICROgram(s)/spray Nasal Spray 1 Spray(s) Both Nostrils two times a day  losartan 50 milliGRAM(s) Oral daily  metoprolol succinate ER 50 milliGRAM(s) Oral two times a day  rivaroxaban 20 milliGRAM(s) Oral every 24 hours  sodium chloride 0.9%. 1000 milliLiter(s) (50 mL/Hr) IV Continuous <Continuous>    MEDICATIONS  (PRN):  acetaminophen   Tablet 650 milliGRAM(s) Oral every 6 hours PRN For Temp greater than 38 C (100.4 F)  ondansetron Injectable 4 milliGRAM(s) IV Push every 6 hours PRN Nausea  oxyCODONE    IR 5 milliGRAM(s) Oral every 4 hours PRN Mild Pain (1 - 3)  oxyCODONE    IR 10 milliGRAM(s) Oral every 4 hours PRN Moderate Pain (4 - 6)  HYDROmorphone  Injectable 0.5 milliGRAM(s) IV Push every 4 hours PRN Severe Pain (7 - 10)  ALBUTerol    0.083% 2.5 milliGRAM(s) Nebulizer every 6 hours PRN Shortness of Breath and/or Wheezing  senna 2 Tablet(s) Oral at bedtime PRN Constipation  docusate sodium 100 milliGRAM(s) Oral daily PRN Constipation  benzonatate 100 milliGRAM(s) Oral every 8 hours PRN Cough          REVIEW OF SYSTEM:  CONSTITUTIONAL: No fever, No change in weight, No fatigue  HEAD: No headache, No dizziness, No recent trauma  EYES: No eye pain, No visual disturbances, No discharge  ENT:  No difficulty hearing, No tinnitus, No vertigo, No sinus pain, No throat pain  NECK: No pain, No stiffness  BREASTS: No pain, No masses, No nipple discharge  RESPIRATORY: No cough, No wheezing, No chills, No hemoptysis, No shortness of breath at rest or exertional shortness of breath  CARDIOVASCULAR: No chest pain, No palpitations, No dizziness, No CHF, No arrhythmia, No cardiomegaly, No leg swelling  GASTROINTESTINAL: No abdominal, No epigastric pain. No nausea, No vomiting, No hematemesis, No diarrhea, No constipation. No melena, No hematochezia. No GERD  GENITOURINARY: No dysuria, No frequency, No hematuria, No incontinence, No nocturia, No hesitancy,  SKIN: No itching, No burning, No rashes, No lesions   LYMPH NODES: No history of enlarged glands  ENDOCRINE: No heat or cold intolerance, No hair loss. No osteoporosis, No thyroid disease  MUSCULOSKELETAL: left hip pain, status post fall and hip fracture  PSYCHIATRIC: No depression, No anxiety, No mood swings, No difficulty sleeping  HEME/LYMPH: No easy bruising, No anticoagulants, No bleeding disorder, No bleeding gums  ALLERGY AND IMMUNOLOGIC: No hives, No eczema  NEUROLOGICAL: No memory loss, No loss of strength, No numbness, No tremors    Vital Signs Last 24 Hrs  T(C): 36.3 (26 Sep 2017 20:01), Max: 36.6 (26 Sep 2017 04:43)  T(F): 97.4 (26 Sep 2017 20:01), Max: 97.9 (26 Sep 2017 04:43)  HR: 71 (26 Sep 2017 20:01) (67 - 71)  BP: 119/95 (26 Sep 2017 20:01) (119/95 - 130/78)  BP(mean): --  RR: 20 (26 Sep 2017 20:01) (18 - 20)  SpO2: 95% (26 Sep 2017 20:01) (95% - 96%)      PHYSICAL EXAM:  GENERAL: NAD, well nourished and conversant  HEAD:  Atraumatic  EYES: EOM, PERRLA, conjunctiva pink and sclera white  ENT: No tonsillar erythema, exudates, or enlargement, moist mucous membranes, good dentition, no lesions  NECK: Supple, No JVD, normal thyroid, carotids with normal upstrokes and no bruits  CHEST/LUNG: Clear to auscultation bilaterally, No rales, rhonchi, wheezing, or rubs  HEART: Regular rate and rhythm, No murmurs, rubs, or gallops  ABDOMEN: Soft, nondistended, no masses, guarding, tenderness or rebound, bowel sounds present  EXTREMITIES:  2+ Peripheral Pulses, No clubbing, cyanosis, or edema. No arthritis of shoulders, elbows, hands, knees, ankles, or feet. No DJD C spine, T spine, or L/S spine. 4+ left hip swelling with no redness or discharge.  LYMPH: No lymphadenopathy noted  SKIN: No rashes or lesions  NERVOUS SYSTEM:  Alert & Oriented X3, normal cognitive function. Motor Strength 5/5 right upper and right lower.  5/5 left upper and left lower extremities, DTRs 2+ intact and symmetric          CBC Full  -  ( 26 Sep 2017 08:00 )  WBC Count : 9.37 K/uL  Hemoglobin : 9.8 g/dL  Hematocrit : 30.0 %  Platelet Count - Automated : 370 K/uL  Mean Cell Volume : 83.3 fl  Mean Cell Hemoglobin : 27.2 pg  Mean Cell Hemoglobin Concentration : 32.7 gm/dL  Auto Neutrophil # : x  Auto Lymphocyte # : x  Auto Monocyte # : x  Auto Eosinophil # : x  Auto Basophil # : x  Auto Neutrophil % : x  Auto Lymphocyte % : x  Auto Monocyte % : x  Auto Eosinophil % : x  Auto Basophil % : x    09-26    128<L>  |  93<L>  |  16  ----------------------------<  110<H>  5.6<H>   |  22  |  0.84    Ca    8.3<L>      26 Sep 2017 07:59                              RADIOLOGY & ADDITIONAL TESTS:

## 2017-09-26 NOTE — PROGRESS NOTE ADULT - ATTENDING COMMENTS
Patient is a 89y old  Male who presents with a chief complaint of Left Hip Pain, IT Fracture/ Fixation with IM Nail. post op course complicated by episode of SVT.  Presently on atrial flutter with a 4:1 block.  He remains asymptomatic with new arrhythmia. However, increased dose of 50 mg Lopressor is causing the patient to be with an acute asthmatic exacerbation, He now is coughing with increased chest congestion and wheezing. Dr Coughlin, Pulmonary has given him Albuterol via HHN q 6 hours prn, Symbicort viia MDI BID and Budesonide oral and nasal BID, with little improvement to date. Patient was seen by [facility] cardiology and is being considered for cardioversion. He remains off anticoagulation at present.

## 2017-09-26 NOTE — PROGRESS NOTE ADULT - SUBJECTIVE AND OBJECTIVE BOX
Patient is a 89y old  Male who presents with a chief complaint of Left Hip Pain, IT Fracture/ Fixation with IM Nail (25 Sep 2017 06:45)    He denies c/o chest pain, SOB or palpitations Hiccoughs improved.    Allergies: No Known Allergies      MEDICATIONS  (STANDING):  atorvastatin 10 milliGRAM(s) Oral at bedtime  finasteride 5 milliGRAM(s) Oral daily  tamsulosin 0.4 milliGRAM(s) Oral at bedtime  buDESOnide 160 MICROgram(s)/formoterol 4.5 MICROgram(s) Inhaler 2 Puff(s) Inhalation two times a day  tiotropium 18 MICROgram(s) Capsule 1 Capsule(s) Inhalation daily  fluticasone propionate 50 MICROgram(s)/spray Nasal Spray 1 Spray(s) Both Nostrils two times a day  losartan 50 milliGRAM(s) Oral daily  rivaroxaban 15 milliGRAM(s) Oral every 24 hours  metoprolol succinate ER 50 milliGRAM(s) Oral two times a day    MEDICATIONS  (PRN):  acetaminophen   Tablet 650 milliGRAM(s) Oral every 6 hours PRN For Temp greater than 38 C (100.4 F)  ondansetron Injectable 4 milliGRAM(s) IV Push every 6 hours PRN Nausea  oxyCODONE    IR 5 milliGRAM(s) Oral every 4 hours PRN Mild Pain (1 - 3)  oxyCODONE    IR 10 milliGRAM(s) Oral every 4 hours PRN Moderate Pain (4 - 6)  HYDROmorphone  Injectable 0.5 milliGRAM(s) IV Push every 4 hours PRN Severe Pain (7 - 10)  ALBUTerol    0.083% 2.5 milliGRAM(s) Nebulizer every 6 hours PRN Shortness of Breath and/or Wheezing  senna 2 Tablet(s) Oral at bedtime PRN Constipation  docusate sodium 100 milliGRAM(s) Oral daily PRN Constipation  benzonatate 100 milliGRAM(s) Oral every 8 hours PRN Cough      PHYSICAL EXAM:  Vital Signs Last 24 Hrs  T(C): 36.6 (26 Sep 2017 04:43), Max: 36.7 (25 Sep 2017 07:42)  T(F): 97.9 (26 Sep 2017 04:43), Max: 98.1 (25 Sep 2017 07:42)  HR: 67 (26 Sep 2017 04:43) (67 - 139)  BP: 129/74 (26 Sep 2017 04:43) (115/69 - 129/74)  BP(mean): --  RR: 18 (26 Sep 2017 04:43) (16 - 18)  SpO2: 95% (26 Sep 2017 04:43) (94% - 95%)  Daily     Daily   I&O's Summary    25 Sep 2017 07:01  -  26 Sep 2017 07:00  --------------------------------------------------------  IN: 220 mL / OUT: 630 mL / NET: -410 mL    General Appearance: 	 Alert, cooperative  HEENT: normocephalic, atraumatic  Neck: 3cm JVD,  carotid 2+  bilaterally without bruits  Lungs:  clear to auscultation and percussion bilaterally anteriorly  Cor:  pmi 5th ICS MCL,  irregular rate and rhythm, S1 normal intensity, S2 normal intensity, no gallops, Grader I/VI apical systolic murmur  Abdomen: soft, non-tender; bowel sounds normal; no masses,  no organomegaly  Extremities: without cyanosis, clubbing or edema  Vasc: 2-+ PT and DP pulses; LE varicosities    Telemetry: atrial flutter with variable block, HR 60-70.    Labs:  CBC Full  -  ( 24 Sep 2017 09:04 )  WBC Count : 7.89 K/uL  Hemoglobin : 9.7 g/dL  Hematocrit : 28.9 %  Platelet Count - Automated : 312 K/uL  Mean Cell Volume : 82.3 fl  Mean Cell Hemoglobin : 27.6 pg  Mean Cell Hemoglobin Concentration : 33.6 gm/dL  Auto Neutrophil # : x  Auto Lymphocyte # : x  Auto Monocyte # : x  Auto Eosinophil # : x  Auto Basophil # : x  Auto Neutrophil % : x  Auto Lymphocyte % : x  Auto Monocyte % : x  Auto Eosinophil % : x  Auto Basophil % : x    09-24    126<L>  |  96  |  17  ----------------------------<  98  4.5   |  21<L>  |  0.94    Ca    8.1<L>      24 Sep 2017 08:52                            Carlo Mcconnell MD [facility]  [phone]

## 2017-09-27 DIAGNOSIS — J40 BRONCHITIS, NOT SPECIFIED AS ACUTE OR CHRONIC: ICD-10-CM

## 2017-09-27 LAB — GAS PNL BLDA: SIGNIFICANT CHANGE UP

## 2017-09-27 PROCEDURE — 93306 TTE W/DOPPLER COMPLETE: CPT | Mod: 26

## 2017-09-27 PROCEDURE — 93010 ELECTROCARDIOGRAM REPORT: CPT

## 2017-09-27 PROCEDURE — 71010: CPT | Mod: 26

## 2017-09-27 PROCEDURE — 99232 SBSQ HOSP IP/OBS MODERATE 35: CPT

## 2017-09-27 RX ADMIN — FINASTERIDE 5 MILLIGRAM(S): 5 TABLET, FILM COATED ORAL at 12:01

## 2017-09-27 RX ADMIN — ALBUTEROL 2.5 MILLIGRAM(S): 90 AEROSOL, METERED ORAL at 05:39

## 2017-09-27 RX ADMIN — TIOTROPIUM BROMIDE 1 CAPSULE(S): 18 CAPSULE ORAL; RESPIRATORY (INHALATION) at 12:47

## 2017-09-27 RX ADMIN — Medication 100 MILLIGRAM(S): at 18:03

## 2017-09-27 RX ADMIN — TAMSULOSIN HYDROCHLORIDE 0.4 MILLIGRAM(S): 0.4 CAPSULE ORAL at 21:01

## 2017-09-27 RX ADMIN — ALBUTEROL 2.5 MILLIGRAM(S): 90 AEROSOL, METERED ORAL at 18:04

## 2017-09-27 RX ADMIN — BUDESONIDE AND FORMOTEROL FUMARATE DIHYDRATE 2 PUFF(S): 160; 4.5 AEROSOL RESPIRATORY (INHALATION) at 05:38

## 2017-09-27 RX ADMIN — RIVAROXABAN 20 MILLIGRAM(S): KIT at 18:03

## 2017-09-27 RX ADMIN — SODIUM CHLORIDE 50 MILLILITER(S): 9 INJECTION INTRAMUSCULAR; INTRAVENOUS; SUBCUTANEOUS at 00:57

## 2017-09-27 RX ADMIN — Medication 1 SPRAY(S): at 18:03

## 2017-09-27 RX ADMIN — Medication 100 MILLIGRAM(S): at 05:39

## 2017-09-27 RX ADMIN — BUDESONIDE AND FORMOTEROL FUMARATE DIHYDRATE 2 PUFF(S): 160; 4.5 AEROSOL RESPIRATORY (INHALATION) at 18:06

## 2017-09-27 RX ADMIN — LOSARTAN POTASSIUM 50 MILLIGRAM(S): 100 TABLET, FILM COATED ORAL at 05:38

## 2017-09-27 RX ADMIN — ATORVASTATIN CALCIUM 10 MILLIGRAM(S): 80 TABLET, FILM COATED ORAL at 21:01

## 2017-09-27 RX ADMIN — Medication 50 MILLIGRAM(S): at 05:38

## 2017-09-27 RX ADMIN — Medication 1 SPRAY(S): at 05:38

## 2017-09-27 RX ADMIN — ALBUTEROL 2.5 MILLIGRAM(S): 90 AEROSOL, METERED ORAL at 22:46

## 2017-09-27 RX ADMIN — Medication 50 MILLIGRAM(S): at 18:03

## 2017-09-27 NOTE — PROGRESS NOTE ADULT - ATTENDING COMMENTS
as above- pulm. stable; cards stable as per Jayesh et al.-moved to monitored bed--rhythm appears stable at present  pain control  increase ambulation and rehab. pending-(pulm. stable for rehab)    Norman Coughlin MD-Pulmonary   [phone] as above- pulm. stable; cards stable as per Jayesh et al.-moved to monitored bed--AF for DCCV today-AC boosted  pain control  increase ambulation and rehab. pending-(pulm. stable for rehab)    Norman Coughlin MD-Pulmonary   [phone]

## 2017-09-27 NOTE — PROGRESS NOTE ADULT - ASSESSMENT
89y Male with history of CAD S/P AWMI remote with LVEF 20-25%, chronic systolic CHF, AICD, essential hypertension, hyperlipidemia and reactive airways admitted after a mechanical fall with left hip fracture. Remina atrial flutter with rate controlled. Due to risk of rapid ventricular response with underlying atrial flutter and possible AICD discharges when patient more active at [facility], to proceed with KAREN guided cardioversion today. EP consult noted.  Hyponatremia unchanged.  No CHF on exam. Continue xarelto 20mg per day. Will reduce toprol  to XL 50mg per day after cardioversion.

## 2017-09-27 NOTE — PROGRESS NOTE ADULT - ASSESSMENT
89 yo M with a h/o asthma, seasonal allergies, CAD, systolic CHF s/p AICD, HTN admitted s/p fall on to left hip with subsequent left hip fracture. Plan for OR for ORIF today.  Asthma history - states he is well controlled, using an Albuterol nebulizer 1-2x a day as needed.  Had an episode of acute bronchitis the end of August and is s/p course of antibiotics and Prednisone.  Currently denies SOB, wheezing or cough.  Not hypoxic, comfortable appearing and speaking in full sentences.  Left hip pain currently controlled     Asthma - mild well controlled      Hyponatremia - unclear etiology, appears euvolemic. 89 yo M with a h/o asthma, seasonal allergies, CAD, systolic CHF s/p AICD, HTN admitted s/p fall on to left hip with subsequent left hip fracture. Plan for OR for ORIF today.  Asthma history - states he is well controlled, using an Albuterol nebulizer 1-2x a day as needed.  Had an episode of acute bronchitis the end of August and is s/p course of antibiotics and Prednisone.  Currently denies SOB, wheezing or cough.  Not hypoxic, comfortable appearing and speaking in full sentences.  Left hip pain currently controlled     Asthma - mild well controlled    Post op AF--EPS follow up    Hyponatremia - unclear etiology, appears euvolemic-chronic

## 2017-09-27 NOTE — PROGRESS NOTE ADULT - PROBLEM SELECTOR PLAN 4
as per Jacques--transfered to [facility] bed as per Jayesh et al--transfered to monitored bed--for DC CV, xarelto boosted

## 2017-09-27 NOTE — PROGRESS NOTE ADULT - SUBJECTIVE AND OBJECTIVE BOX
Pt seen and examined; pain controlled, transferred to tele yesterday for arrythmia, stable overnight    NAD  LLE dressings with S/S drainage, replaced today  Incisions c/d/i  +PF/DF  SILT at foot  WWP        89M s/p L hip IMN; POD7  1. Pain control  2. DVT ppx  3. WBAT/PT/OT/OOB  4. IS  5. Fluid restricted 1200 cc  6. fu Cards recs  7. NPO for possible cardioversion

## 2017-09-27 NOTE — PROVIDER CONTACT NOTE (OTHER) - SITUATION
Pts potassium was elevated on 09/26 5.6. No repeat labs drawn to reassess elevated potassium, no interventions for elevated potassium.

## 2017-09-27 NOTE — PROGRESS NOTE ADULT - SUBJECTIVE AND OBJECTIVE BOX
Patient is a 89y old  Male who presents with a chief complaint of Left Hip Pain, IT Fracture/ Fixation with IM Nail . hospital course complicated by rapid afib and pulmonary congestion        MEDICATIONS  (STANDING):  atorvastatin 10 milliGRAM(s) Oral at bedtime  finasteride 5 milliGRAM(s) Oral daily  tamsulosin 0.4 milliGRAM(s) Oral at bedtime  buDESOnide 160 MICROgram(s)/formoterol 4.5 MICROgram(s) Inhaler 2 Puff(s) Inhalation two times a day  tiotropium 18 MICROgram(s) Capsule 1 Capsule(s) Inhalation daily  fluticasone propionate 50 MICROgram(s)/spray Nasal Spray 1 Spray(s) Both Nostrils two times a day  losartan 50 milliGRAM(s) Oral daily  metoprolol succinate ER 50 milliGRAM(s) Oral two times a day  rivaroxaban 20 milliGRAM(s) Oral every 24 hours  sodium chloride 0.9%. 1000 milliLiter(s) (50 mL/Hr) IV Continuous <Continuous>    MEDICATIONS  (PRN):  acetaminophen   Tablet 650 milliGRAM(s) Oral every 6 hours PRN For Temp greater than 38 C (100.4 F)  ondansetron Injectable 4 milliGRAM(s) IV Push every 6 hours PRN Nausea  oxyCODONE    IR 5 milliGRAM(s) Oral every 4 hours PRN Mild Pain (1 - 3)  oxyCODONE    IR 10 milliGRAM(s) Oral every 4 hours PRN Moderate Pain (4 - 6)  HYDROmorphone  Injectable 0.5 milliGRAM(s) IV Push every 4 hours PRN Severe Pain (7 - 10)  ALBUTerol    0.083% 2.5 milliGRAM(s) Nebulizer every 6 hours PRN Shortness of Breath and/or Wheezing  senna 2 Tablet(s) Oral at bedtime PRN Constipation  docusate sodium 100 milliGRAM(s) Oral daily PRN Constipation  benzonatate 100 milliGRAM(s) Oral every 8 hours PRN Cough        REVIEW OF SYSTEM:  CONSTITUTIONAL: No fever, No change in weight, No fatigue  HEAD: No headache, No dizziness, No recent trauma  EYES: No eye pain, No visual disturbances, No discharge  ENT:  No difficulty hearing, No tinnitus, No vertigo, No sinus pain, No throat pain  NECK: No pain, No stiffness  BREASTS: No pain, No masses, No nipple discharge  RESPIRATORY: + cough, No wheezing, No chills, No hemoptysis, No shortness of breath at rest or exertional shortness of breath  CARDIOVASCULAR: No chest pain, No palpitations, No dizziness, No CHF, No arrhythmia, No cardiomegaly, No leg swelling  GASTROINTESTINAL: No abdominal, No epigastric pain. No nausea, No vomiting, No hematemesis, No diarrhea, No constipation. No melena, No hematochezia. No GERD  GENITOURINARY: No dysuria, No frequency, No hematuria, No incontinence, No nocturia, No hesitancy,  SKIN: No itching, No burning, No rashes, No lesions   LYMPH NODES: No history of enlarged glands  ENDOCRINE: No heat or cold intolerance, No hair loss. No osteoporosis, No thyroid disease  MUSCULOSKELETAL: left hip pain, status post fall and hip fracture  PSYCHIATRIC: No depression, No anxiety, No mood swings, No difficulty sleeping  HEME/LYMPH: No easy bruising, No anticoagulants, No bleeding disorder, No bleeding gums  ALLERGY AND IMMUNOLOGIC: No hives, No eczema  NEUROLOGICAL: No memory loss, No loss of strength, No numbness, No tremors    VITALS:   T(C): 36.5 (09-27-17 @ 20:54), Max: 36.7 (09-27-17 @ 09:02)  HR: 68 (09-27-17 @ 20:54) (68 - 86)  BP: 113/68 (09-27-17 @ 20:54) (107/64 - 126/74)  RR: 19 (09-27-17 @ 20:54) (18 - 19)  SpO2: 94% (09-27-17 @ 20:54) (94% - 98%)  Wt(kg): --    PHYSICAL EXAM:  GENERAL: NAD, well nourished and conversant  HEAD:  Atraumatic  EYES: EOM, PERRLA, conjunctiva pink and sclera white  ENT: No tonsillar erythema, exudates, or enlargement, moist mucous membranes, good dentition, no lesions  NECK: Supple, No JVD, normal thyroid, carotids with normal upstrokes and no bruits  CHEST/LUNG: slight rhonchi,   HEART: Regular rate and rhythm, No murmurs, rubs, or gallops  ABDOMEN: Soft, nondistended, no masses, guarding, tenderness or rebound, bowel sounds present  EXTREMITIES:  2+ Peripheral Pulses, No clubbing, cyanosis, or edema. No arthritis of shoulders, elbows, hands, knees, ankles, or feet. No DJD C spine, T spine, or L/S spine. 4+ left hip swelling with no redness or discharge.  LYMPH: No lymphadenopathy noted  SKIN: No rashes or lesions  NERVOUS SYSTEM:  Alert & Oriented X3, normal cognitive function. Motor Strength 5/5 right upper and right lower.  5/5 left upper and left lower extremities, DTRs 2+ intact and symmetric    LABS:  ABG - ( 27 Sep 2017 10:44 )  pH: 7.48  /  pCO2: 32    /  pO2: 96    / HCO3: 23    / Base Excess: .5    /  SaO2: 98        CBC Full  -  ( 26 Sep 2017 08:00 )  WBC Count : 9.37 K/uL  Hemoglobin : 9.8 g/dL  Hematocrit : 30.0 %  Platelet Count - Automated : 370 K/uL  Mean Cell Volume : 83.3 fl  Mean Cell Hemoglobin : 27.2 pg  Mean Cell Hemoglobin Concentration : 32.7 gm/dL  Auto Neutrophil # : x  Auto Lymphocyte # : x  Auto Monocyte # : x  Auto Eosinophil # : x  Auto Basophil # : x  Auto Neutrophil % : x  Auto Lymphocyte % : x  Auto Monocyte % : x  Auto Eosinophil % : x  Auto Basophil % : x    09-26    128<L>  |  93<L>  |  16  ----------------------------<  110<H>  5.6<H>   |  22  |  0.84    Ca    8.3<L>      26 Sep 2017 07:59            CAPILLARY BLOOD GLUCOSE          RADIOLOGY & ADDITIONAL TESTS:

## 2017-09-27 NOTE — PROGRESS NOTE ADULT - SUBJECTIVE AND OBJECTIVE BOX
CHIEF COMPLAINT:    Interval Events:    REVIEW OF SYSTEMS:  Constitutional: No fevers or chills. No weight loss. No fatigue or generalized malaise.  Eyes: No itching or discharge from the eyes  ENT: No ear pain. No ear discharge. No nasal congestion. No post nasal drip. No epistaxis. No throat pain. No sore throat. No difficulty swallowing.   CV: No chest pain. No palpitations. No lightheadedness or dizziness.   Resp: No dyspnea at rest. No dyspnea on exertion. No orthopnea. No wheezing. No cough. No stridor. No sputum production. No chest pain with respiration.  GI: No nausea. No vomiting. No diarrhea.  MSK: No joint pain or pain in any extremities  Integumentary: No skin lesions. No pedal edema.  Neurological: No gross motor weakness. No sensory changes.  [ ] All other systems negative  [ ] Unable to assess ROS because ________    OBJECTIVE:  ICU Vital Signs Last 24 Hrs  T(C): 36.4 (27 Sep 2017 04:52), Max: 36.6 (26 Sep 2017 14:17)  T(F): 97.5 (27 Sep 2017 04:52), Max: 97.9 (26 Sep 2017 14:17)  HR: 84 (27 Sep 2017 04:52) (68 - 84)  BP: 121/78 (27 Sep 2017 04:52) (119/95 - 130/78)  BP(mean): --  ABP: --  ABP(mean): --  RR: 18 (27 Sep 2017 04:52) (18 - 20)  SpO2: 98% (27 Sep 2017 04:52) (95% - 98%)        09-25 @ 07:01 - 09-26 @ 07:00  --------------------------------------------------------  IN: 220 mL / OUT: 630 mL / NET: -410 mL    09-26 @ 07:01 - 09-27 @ 05:17  --------------------------------------------------------  IN: 1160 mL / OUT: 1050 mL / NET: 110 mL      CAPILLARY BLOOD GLUCOSE          PHYSICAL EXAM:  General: Awake, alert, oriented X 3.   HEENT: Atraumatic, normocephalic.                 Mallampatti Grade                 No nasal congestion.                No tonsillar or pharyngeal exudates.  Lymph Nodes: No palpable lymphadenopathy  Neck: No JVD. No carotid bruit.   Respiratory: Normal chest expansion                         Normal percussion                         Normal and equal air entry                         No wheeze, rhonchi or rales.  Cardiovascular: S1 S2 normal. No murmurs, rubs or gallops.   Abdomen: Soft, non-tender, non-distended. No organomegaly.  Extremities: Warm to touch. Peripheral pulse palpable. No pedal edema.   Skin: No rashes or skin lesions  Neurological: Motor and sensory examination equal and normal in all four extremities.  Psychiatry: Appropriate mood and affect.    HOSPITAL MEDICATIONS:  MEDICATIONS  (STANDING):  atorvastatin 10 milliGRAM(s) Oral at bedtime  finasteride 5 milliGRAM(s) Oral daily  tamsulosin 0.4 milliGRAM(s) Oral at bedtime  buDESOnide 160 MICROgram(s)/formoterol 4.5 MICROgram(s) Inhaler 2 Puff(s) Inhalation two times a day  tiotropium 18 MICROgram(s) Capsule 1 Capsule(s) Inhalation daily  fluticasone propionate 50 MICROgram(s)/spray Nasal Spray 1 Spray(s) Both Nostrils two times a day  losartan 50 milliGRAM(s) Oral daily  metoprolol succinate ER 50 milliGRAM(s) Oral two times a day  rivaroxaban 20 milliGRAM(s) Oral every 24 hours  sodium chloride 0.9%. 1000 milliLiter(s) (50 mL/Hr) IV Continuous <Continuous>    MEDICATIONS  (PRN):  acetaminophen   Tablet 650 milliGRAM(s) Oral every 6 hours PRN For Temp greater than 38 C (100.4 F)  ondansetron Injectable 4 milliGRAM(s) IV Push every 6 hours PRN Nausea  oxyCODONE    IR 5 milliGRAM(s) Oral every 4 hours PRN Mild Pain (1 - 3)  oxyCODONE    IR 10 milliGRAM(s) Oral every 4 hours PRN Moderate Pain (4 - 6)  HYDROmorphone  Injectable 0.5 milliGRAM(s) IV Push every 4 hours PRN Severe Pain (7 - 10)  ALBUTerol    0.083% 2.5 milliGRAM(s) Nebulizer every 6 hours PRN Shortness of Breath and/or Wheezing  senna 2 Tablet(s) Oral at bedtime PRN Constipation  docusate sodium 100 milliGRAM(s) Oral daily PRN Constipation  benzonatate 100 milliGRAM(s) Oral every 8 hours PRN Cough      LABS:                        9.8    9.37  )-----------( 370      ( 26 Sep 2017 08:00 )             30.0     09-26    128<L>  |  93<L>  |  16  ----------------------------<  110<H>  5.6<H>   |  22  |  0.84    Ca    8.3<L>      26 Sep 2017 07:59                MICROBIOLOGY:     RADIOLOGY:  [ ] Reviewed and interpreted by me    Point of Care Ultrasound Findings:    PFT:    EKG: CHIEF COMPLAINT:good spirits, minimal cough and congestion    Interval Events:for DC cardioversion by EPS today; OOB by PT    REVIEW OF SYSTEMS:  Constitutional: No fevers or chills. No weight loss. No fatigue or generalized malaise.  Eyes: No itching or discharge from the eyes  ENT: No ear pain. No ear discharge. No nasal congestion. No post nasal drip. No epistaxis. No throat pain. No sore throat. No difficulty swallowing.   CV: No chest pain. No palpitations. No lightheadedness or dizziness.   Resp: No dyspnea at rest. No dyspnea on exertion. No orthopnea. No wheezing. + cough. No stridor. No sputum production. No chest pain with respiration.  GI: No nausea. No vomiting. No diarrhea.  MSK: No joint pain or pain in any extremities  Integumentary: No skin lesions. No pedal edema.  Neurological: No gross motor weakness. No sensory changes.  [+ ] All other systems negative  [ ] Unable to assess ROS because ________    OBJECTIVE:  ICU Vital Signs Last 24 Hrs  T(C): 36.4 (27 Sep 2017 04:52), Max: 36.6 (26 Sep 2017 14:17)  T(F): 97.5 (27 Sep 2017 04:52), Max: 97.9 (26 Sep 2017 14:17)  HR: 84 (27 Sep 2017 04:52) (68 - 84)  BP: 121/78 (27 Sep 2017 04:52) (119/95 - 130/78)  BP(mean): --  ABP: --  ABP(mean): --  RR: 18 (27 Sep 2017 04:52) (18 - 20)  SpO2: 98% (27 Sep 2017 04:52) (95% - 98%)        09-25 @ 07:01 - 09-26 @ 07:00  --------------------------------------------------------  IN: 220 mL / OUT: 630 mL / NET: -410 mL    09-26 @ 07:01 - 09-27 @ 05:17  --------------------------------------------------------  IN: 1160 mL / OUT: 1050 mL / NET: 110 mL      CAPILLARY BLOOD GLUCOSE          PHYSICAL EXAM:NADin bed  General: Awake, alert, oriented X 3.   HEENT: Atraumatic, normocephalic.                 Mallampatti Grade 2                No nasal congestion.                No tonsillar or pharyngeal exudates.  Lymph Nodes: No palpable lymphadenopathy  Neck: No JVD. No carotid bruit.   Respiratory: Normal chest expansion                         Normal percussion                         Normal and equal air entry                         No wheeze, rhonchi or rales.  Cardiovascular: S1 S2 irregular No murmurs, rubs or gallops.   Abdomen: Soft, non-tender, non-distended. No organomegaly.  Extremities: Warm to touch. Peripheral pulse palpable. No pedal edema.   Skin: No rashes or skin lesions  Neurological: Motor and sensory examination equal and normal in all four extremities.  Psychiatry: Appropriate mood and affect.    HOSPITAL MEDICATIONS:  MEDICATIONS  (STANDING):  atorvastatin 10 milliGRAM(s) Oral at bedtime  finasteride 5 milliGRAM(s) Oral daily  tamsulosin 0.4 milliGRAM(s) Oral at bedtime  buDESOnide 160 MICROgram(s)/formoterol 4.5 MICROgram(s) Inhaler 2 Puff(s) Inhalation two times a day  tiotropium 18 MICROgram(s) Capsule 1 Capsule(s) Inhalation daily  fluticasone propionate 50 MICROgram(s)/spray Nasal Spray 1 Spray(s) Both Nostrils two times a day  losartan 50 milliGRAM(s) Oral daily  metoprolol succinate ER 50 milliGRAM(s) Oral two times a day  rivaroxaban 20 milliGRAM(s) Oral every 24 hours  sodium chloride 0.9%. 1000 milliLiter(s) (50 mL/Hr) IV Continuous <Continuous>    MEDICATIONS  (PRN):  acetaminophen   Tablet 650 milliGRAM(s) Oral every 6 hours PRN For Temp greater than 38 C (100.4 F)  ondansetron Injectable 4 milliGRAM(s) IV Push every 6 hours PRN Nausea  oxyCODONE    IR 5 milliGRAM(s) Oral every 4 hours PRN Mild Pain (1 - 3)  oxyCODONE    IR 10 milliGRAM(s) Oral every 4 hours PRN Moderate Pain (4 - 6)  HYDROmorphone  Injectable 0.5 milliGRAM(s) IV Push every 4 hours PRN Severe Pain (7 - 10)  ALBUTerol    0.083% 2.5 milliGRAM(s) Nebulizer every 6 hours PRN Shortness of Breath and/or Wheezing  senna 2 Tablet(s) Oral at bedtime PRN Constipation  docusate sodium 100 milliGRAM(s) Oral daily PRN Constipation  benzonatate 100 milliGRAM(s) Oral every 8 hours PRN Cough      LABS:                        9.8    9.37  )-----------( 370      ( 26 Sep 2017 08:00 )             30.0     09-26    128<L>  |  93<L>  |  16  ----------------------------<  110<H>  5.6<H>   |  22  |  0.84    Ca    8.3<L>      26 Sep 2017 07:59                MICROBIOLOGY:     RADIOLOGY:  [ ] Reviewed and interpreted by me    Point of Care Ultrasound Findings:    PFT:    EKG:

## 2017-09-27 NOTE — PROGRESS NOTE ADULT - SUBJECTIVE AND OBJECTIVE BOX
Patient is a 89y old  Male who presents with a chief complaint of Left Hip Pain, IT Fracture/ Fixation with IM Nail (25 Sep 2017 06:45)    He notes intermittent cough.  He denies c/o chest pain, SOB or palpitations.    Allergies: No Known Allergies    MEDICATIONS  (STANDING):  atorvastatin 10 milliGRAM(s) Oral at bedtime  finasteride 5 milliGRAM(s) Oral daily  tamsulosin 0.4 milliGRAM(s) Oral at bedtime  buDESOnide 160 MICROgram(s)/formoterol 4.5 MICROgram(s) Inhaler 2 Puff(s) Inhalation two times a day  tiotropium 18 MICROgram(s) Capsule 1 Capsule(s) Inhalation daily  fluticasone propionate 50 MICROgram(s)/spray Nasal Spray 1 Spray(s) Both Nostrils two times a day  losartan 50 milliGRAM(s) Oral daily  metoprolol succinate ER 50 milliGRAM(s) Oral two times a day  rivaroxaban 20 milliGRAM(s) Oral every 24 hours  sodium chloride 0.9%. 1000 milliLiter(s) (50 mL/Hr) IV Continuous <Continuous>    MEDICATIONS  (PRN):  acetaminophen   Tablet 650 milliGRAM(s) Oral every 6 hours PRN For Temp greater than 38 C (100.4 F)  ondansetron Injectable 4 milliGRAM(s) IV Push every 6 hours PRN Nausea  oxyCODONE    IR 5 milliGRAM(s) Oral every 4 hours PRN Mild Pain (1 - 3)  oxyCODONE    IR 10 milliGRAM(s) Oral every 4 hours PRN Moderate Pain (4 - 6)  HYDROmorphone  Injectable 0.5 milliGRAM(s) IV Push every 4 hours PRN Severe Pain (7 - 10)  ALBUTerol    0.083% 2.5 milliGRAM(s) Nebulizer every 6 hours PRN Shortness of Breath and/or Wheezing  senna 2 Tablet(s) Oral at bedtime PRN Constipation  docusate sodium 100 milliGRAM(s) Oral daily PRN Constipation  benzonatate 100 milliGRAM(s) Oral every 8 hours PRN Cough      PHYSICAL EXAM:  Vital Signs Last 24 Hrs  T(C): 36.4 (27 Sep 2017 04:52), Max: 36.6 (26 Sep 2017 14:17)  T(F): 97.5 (27 Sep 2017 04:52), Max: 97.9 (26 Sep 2017 14:17)  HR: 84 (27 Sep 2017 04:52) (68 - 84)  BP: 121/78 (27 Sep 2017 04:52) (119/95 - 130/78)  BP(mean): --  RR: 18 (27 Sep 2017 04:52) (18 - 20)  SpO2: 98% (27 Sep 2017 04:52) (95% - 98%)  Daily     Daily   I&O's Summary    25 Sep 2017 07:01  -  26 Sep 2017 07:00  --------------------------------------------------------  IN: 220 mL / OUT: 630 mL / NET: -410 mL    26 Sep 2017 07:01  -  27 Sep 2017 06:45  --------------------------------------------------------  IN: 1340 mL / OUT: 1100 mL / NET: 240 mL    General Appearance: 	 Alert, cooperative  HEENT: normocephalic, atraumatic  Neck: 3cm JVD,  carotid 2+  bilaterally without bruits  Lungs:  clear to auscultation and percussion bilaterally anteriorly  Cor:  pmi 5th ICS MCL,  irregular rate and rhythm, S1 normal intensity, S2 normal intensity, no gallops, Grader I/VI apical systolic murmur  Abdomen: soft, non-tender; bowel sounds normal; no masses,  no organomegaly  Extremities: without cyanosis, clubbing or edema  Vasc: 2-+ PT and DP pulses; LE varicosities    Telemetry: atrial flutter /min.     Labs:  CBC Full  -  ( 26 Sep 2017 08:00 )  WBC Count : 9.37 K/uL  Hemoglobin : 9.8 g/dL  Hematocrit : 30.0 %  Platelet Count - Automated : 370 K/uL  Mean Cell Volume : 83.3 fl  Mean Cell Hemoglobin : 27.2 pg  Mean Cell Hemoglobin Concentration : 32.7 gm/dL  Auto Neutrophil # : x  Auto Lymphocyte # : x  Auto Monocyte # : x  Auto Eosinophil # : x  Auto Basophil # : x  Auto Neutrophil % : x  Auto Lymphocyte % : x  Auto Monocyte % : x  Auto Eosinophil % : x  Auto Basophil % : x    09-26    128<L>  |  93<L>  |  16  ----------------------------<  110<H>  5.6<H>   |  22  |  0.84    Ca    8.3<L>      26 Sep 2017 07:59                              Carlo Mcconnell MD [facility]  [phone]

## 2017-09-27 NOTE — PROVIDER CONTACT NOTE (OTHER) - SITUATION
Pt states he feels congested. Pts breath sounds coarse b/l, pt denies SOB no wheezing noted. Pt requesting nebulizer treatment to help with congestion, pt received nebulizer at 18:04 scheduled for Q6H

## 2017-09-27 NOTE — PROGRESS NOTE ADULT - ASSESSMENT
Patient with hip fracture treated with locking intramedullary jay jya. Patient is feeling well a lttile tired and now needs to get back on his feet Denies chest pain SoB, palpitations. Recovery and ambulation compromised by her advanced age. Was on postoperative xarelto for hip fracture, plus chronic atrial fibrillation, held for a possible cardiac electrical versus chemical cardioversion.. Chronic hyponatremia, of no clinical significance, at present.  Xarelto held for possible cardioversion. cardioversion canceled due to suggested bronchitis

## 2017-09-28 LAB
ANION GAP SERPL CALC-SCNC: 13 MMOL/L — SIGNIFICANT CHANGE UP (ref 5–17)
BUN SERPL-MCNC: 17 MG/DL — SIGNIFICANT CHANGE UP (ref 7–23)
CALCIUM SERPL-MCNC: 8.1 MG/DL — LOW (ref 8.4–10.5)
CHLORIDE SERPL-SCNC: 92 MMOL/L — LOW (ref 96–108)
CO2 SERPL-SCNC: 21 MMOL/L — LOW (ref 22–31)
CREAT SERPL-MCNC: 0.89 MG/DL — SIGNIFICANT CHANGE UP (ref 0.5–1.3)
GLUCOSE SERPL-MCNC: 102 MG/DL — HIGH (ref 70–99)
HCT VFR BLD CALC: 31.7 % — LOW (ref 39–50)
HGB BLD-MCNC: 10.4 G/DL — LOW (ref 13–17)
MCHC RBC-ENTMCNC: 27.4 PG — SIGNIFICANT CHANGE UP (ref 27–34)
MCHC RBC-ENTMCNC: 32.8 GM/DL — SIGNIFICANT CHANGE UP (ref 32–36)
MCV RBC AUTO: 83.6 FL — SIGNIFICANT CHANGE UP (ref 80–100)
PLATELET # BLD AUTO: 374 K/UL — SIGNIFICANT CHANGE UP (ref 150–400)
POTASSIUM SERPL-MCNC: 4.6 MMOL/L — SIGNIFICANT CHANGE UP (ref 3.5–5.3)
POTASSIUM SERPL-SCNC: 4.6 MMOL/L — SIGNIFICANT CHANGE UP (ref 3.5–5.3)
RBC # BLD: 3.79 M/UL — LOW (ref 4.2–5.8)
RBC # FLD: 17.1 % — HIGH (ref 10.3–14.5)
SODIUM SERPL-SCNC: 126 MMOL/L — LOW (ref 135–145)
WBC # BLD: 12.96 K/UL — HIGH (ref 3.8–10.5)
WBC # FLD AUTO: 12.96 K/UL — HIGH (ref 3.8–10.5)

## 2017-09-28 PROCEDURE — 99233 SBSQ HOSP IP/OBS HIGH 50: CPT

## 2017-09-28 PROCEDURE — 93325 DOPPLER ECHO COLOR FLOW MAPG: CPT | Mod: 26

## 2017-09-28 PROCEDURE — 93320 DOPPLER ECHO COMPLETE: CPT | Mod: 26

## 2017-09-28 PROCEDURE — 92960 CARDIOVERSION ELECTRIC EXT: CPT

## 2017-09-28 PROCEDURE — 93010 ELECTROCARDIOGRAM REPORT: CPT | Mod: 76

## 2017-09-28 PROCEDURE — 93312 ECHO TRANSESOPHAGEAL: CPT | Mod: 26

## 2017-09-28 RX ADMIN — BUDESONIDE AND FORMOTEROL FUMARATE DIHYDRATE 2 PUFF(S): 160; 4.5 AEROSOL RESPIRATORY (INHALATION) at 05:08

## 2017-09-28 RX ADMIN — Medication 100 MILLIGRAM(S): at 21:55

## 2017-09-28 RX ADMIN — Medication 1 SPRAY(S): at 05:08

## 2017-09-28 RX ADMIN — ALBUTEROL 2.5 MILLIGRAM(S): 90 AEROSOL, METERED ORAL at 11:30

## 2017-09-28 RX ADMIN — TIOTROPIUM BROMIDE 1 CAPSULE(S): 18 CAPSULE ORAL; RESPIRATORY (INHALATION) at 14:06

## 2017-09-28 RX ADMIN — Medication 50 MILLIGRAM(S): at 19:35

## 2017-09-28 RX ADMIN — ALBUTEROL 2.5 MILLIGRAM(S): 90 AEROSOL, METERED ORAL at 05:10

## 2017-09-28 RX ADMIN — RIVAROXABAN 20 MILLIGRAM(S): KIT at 19:36

## 2017-09-28 RX ADMIN — TAMSULOSIN HYDROCHLORIDE 0.4 MILLIGRAM(S): 0.4 CAPSULE ORAL at 21:55

## 2017-09-28 RX ADMIN — Medication 1 SPRAY(S): at 19:34

## 2017-09-28 RX ADMIN — Medication 100 MILLIGRAM(S): at 05:07

## 2017-09-28 RX ADMIN — ATORVASTATIN CALCIUM 10 MILLIGRAM(S): 80 TABLET, FILM COATED ORAL at 21:55

## 2017-09-28 RX ADMIN — LOSARTAN POTASSIUM 50 MILLIGRAM(S): 100 TABLET, FILM COATED ORAL at 05:07

## 2017-09-28 RX ADMIN — FINASTERIDE 5 MILLIGRAM(S): 5 TABLET, FILM COATED ORAL at 19:34

## 2017-09-28 RX ADMIN — BUDESONIDE AND FORMOTEROL FUMARATE DIHYDRATE 2 PUFF(S): 160; 4.5 AEROSOL RESPIRATORY (INHALATION) at 19:35

## 2017-09-28 RX ADMIN — Medication 50 MILLIGRAM(S): at 05:07

## 2017-09-28 NOTE — PROGRESS NOTE ADULT - PROBLEM SELECTOR PLAN 7
DVT prophlaxis:--xarelto  sequential teds stockings  early ambulation  PT evaluation  early ambulation  incentive spirometry  GI prophylaxis:  PPI pre breakfast  aspiration precautions-all meals are to OOB as able

## 2017-09-28 NOTE — PROGRESS NOTE ADULT - ASSESSMENT
Patient with hip fracture treated with locking intramedullary jay jay. Patient is feeling well a lttile tired and now needs to get back on his feet Denies chest pain SoB, palpitations. Recovery and ambulation compromised by her advanced age. Was on postoperative xarelto for hip fracture, plus chronic atrial fibrillation, held for a possible cardiac electrical versus chemical cardioversion.. Chronic hyponatremia, of no clinical significance, at present.  Xarelto held for possible cardioversion. cardioversion canceled due to suggested bronchitis

## 2017-09-28 NOTE — PROGRESS NOTE ADULT - SUBJECTIVE AND OBJECTIVE BOX
Patient is a 89y old  Male who presents with a chief complaint of Left Hip Pain, IT Fracture/ Fixation with IM Nail . hospital course complicated by rapid afib and pulmonary congestion    MEDICATIONS  (STANDING):  atorvastatin 10 milliGRAM(s) Oral at bedtime  finasteride 5 milliGRAM(s) Oral daily  tamsulosin 0.4 milliGRAM(s) Oral at bedtime  buDESOnide 160 MICROgram(s)/formoterol 4.5 MICROgram(s) Inhaler 2 Puff(s) Inhalation two times a day  tiotropium 18 MICROgram(s) Capsule 1 Capsule(s) Inhalation daily  fluticasone propionate 50 MICROgram(s)/spray Nasal Spray 1 Spray(s) Both Nostrils two times a day  losartan 50 milliGRAM(s) Oral daily  metoprolol succinate ER 50 milliGRAM(s) Oral two times a day  rivaroxaban 20 milliGRAM(s) Oral every 24 hours  sodium chloride 0.9%. 1000 milliLiter(s) (50 mL/Hr) IV Continuous <Continuous>    MEDICATIONS  (PRN):  acetaminophen   Tablet 650 milliGRAM(s) Oral every 6 hours PRN For Temp greater than 38 C (100.4 F)  ondansetron Injectable 4 milliGRAM(s) IV Push every 6 hours PRN Nausea  ALBUTerol    0.083% 2.5 milliGRAM(s) Nebulizer every 6 hours PRN Shortness of Breath and/or Wheezing  senna 2 Tablet(s) Oral at bedtime PRN Constipation  docusate sodium 100 milliGRAM(s) Oral daily PRN Constipation  benzonatate 100 milliGRAM(s) Oral every 8 hours PRN Cough    REVIEW OF SYSTEM:  CONSTITUTIONAL: No fever, No change in weight, No fatigue  HEAD: No headache, No dizziness, No recent trauma  EYES: No eye pain, No visual disturbances, No discharge  ENT:  No difficulty hearing, No tinnitus, No vertigo, No sinus pain, No throat pain  NECK: No pain, No stiffness  BREASTS: No pain, No masses, No nipple discharge  RESPIRATORY: + cough, No wheezing, No chills, No hemoptysis, No shortness of breath at rest or exertional shortness of breath  CARDIOVASCULAR: No chest pain, No palpitations, No dizziness, No CHF, No arrhythmia, No cardiomegaly, No leg swelling  GASTROINTESTINAL: No abdominal, No epigastric pain. No nausea, No vomiting, No hematemesis, No diarrhea, No constipation. No melena, No hematochezia. No GERD  GENITOURINARY: No dysuria, No frequency, No hematuria, No incontinence, No nocturia, No hesitancy,  SKIN: No itching, No burning, No rashes, No lesions   LYMPH NODES: No history of enlarged glands  ENDOCRINE: No heat or cold intolerance, No hair loss. No osteoporosis, No thyroid disease  MUSCULOSKELETAL: left hip pain, status post fall and hip fracture  PSYCHIATRIC: No depression, No anxiety, No mood swings, No difficulty sleeping  HEME/LYMPH: No easy bruising, No anticoagulants, No bleeding disorder, No bleeding gums  ALLERGY AND IMMUNOLOGIC: No hives, No eczema  NEUROLOGICAL: No memory loss, No loss of strength, No numbness, No tremors      VITALS:   T(C): 36.4 (09-28-17 @ 19:31), Max: 36.6 (09-28-17 @ 12:18)  HR: 71 (09-28-17 @ 21:01) (68 - 97)  BP: 115/74 (09-28-17 @ 21:01) (103/66 - 128/71)  RR: 18 (09-28-17 @ 21:01) (17 - 20)  SpO2: 98% (09-28-17 @ 21:01) (96% - 99%)  Wt(kg): --    PHYSICAL EXAM:  GENERAL: NAD, well nourished and conversant  HEAD:  Atraumatic  EYES: EOM, PERRLA, conjunctiva pink and sclera white  ENT: No tonsillar erythema, exudates, or enlargement, moist mucous membranes, good dentition, no lesions  NECK: Supple, No JVD, normal thyroid, carotids with normal upstrokes and no bruits  CHEST/LUNG: slight rhonchi,   HEART: Regular rate and rhythm, No murmurs, rubs, or gallops  ABDOMEN: Soft, nondistended, no masses, guarding, tenderness or rebound, bowel sounds present  EXTREMITIES:  2+ Peripheral Pulses, No clubbing, cyanosis, or edema. No arthritis of shoulders, elbows, hands, knees, ankles, or feet. No DJD C spine, T spine, or L/S spine. 4+ left hip swelling with no redness or discharge.  LYMPH: No lymphadenopathy noted  SKIN: No rashes or lesions  NERVOUS SYSTEM:  Alert & Oriented X3, normal cognitive function. Motor Strength 5/5 right upper and right lower.  5/5 left upper and left lower extremities, DTRs 2+ intact and symmetric    LABS:  ABG - ( 27 Sep 2017 10:44 )  pH: 7.48  /  pCO2: 32    /  pO2: 96    / HCO3: 23    / Base Excess: .5    /  SaO2: 98                    CBC Full  -  ( 28 Sep 2017 08:40 )  WBC Count : 12.96 K/uL  Hemoglobin : 10.4 g/dL  Hematocrit : 31.7 %  Platelet Count - Automated : 374 K/uL  Mean Cell Volume : 83.6 fl  Mean Cell Hemoglobin : 27.4 pg  Mean Cell Hemoglobin Concentration : 32.8 gm/dL  Auto Neutrophil # : x  Auto Lymphocyte # : x  Auto Monocyte # : x  Auto Eosinophil # : x  Auto Basophil # : x  Auto Neutrophil % : x  Auto Lymphocyte % : x  Auto Monocyte % : x  Auto Eosinophil % : x  Auto Basophil % : x    09-28    126<L>  |  92<L>  |  17  ----------------------------<  102<H>  4.6   |  21<L>  |  0.89    Ca    8.1<L>      28 Sep 2017 08:33            CAPILLARY BLOOD GLUCOSE          RADIOLOGY & ADDITIONAL TESTS:

## 2017-09-28 NOTE — PROGRESS NOTE ADULT - SUBJECTIVE AND OBJECTIVE BOX
CHIEF COMPLAINT:mild cough--no sob or GARCIA    Interval Events:aborted KAREN 9/27    REVIEW OF SYSTEMS:  Constitutional: No fevers or chills. No weight loss. No fatigue or generalized malaise.  Eyes: No itching or discharge from the eyes  ENT: No ear pain. No ear discharge. No nasal congestion. No post nasal drip. No epistaxis. No throat pain. No sore throat. No difficulty swallowing.   CV: No chest pain. No palpitations. No lightheadedness or dizziness.   Resp: No dyspnea at rest. No dyspnea on exertion. No orthopnea. No wheezing. No cough. No stridor. No sputum production. No chest pain with respiration.  GI: No nausea. No vomiting. No diarrhea.  MSK: No joint pain or pain in any extremities  Integumentary: No skin lesions. No pedal edema.  Neurological: No gross motor weakness. No sensory changes.  [+ ] All other systems negative  [ ] Unable to assess ROS because ________    OBJECTIVE:  ICU Vital Signs Last 24 Hrs  T(C): 36.4 (28 Sep 2017 05:04), Max: 36.7 (27 Sep 2017 09:02)  T(F): 97.6 (28 Sep 2017 05:04), Max: 98 (27 Sep 2017 09:02)  HR: 97 (28 Sep 2017 05:04) (68 - 97)  BP: 128/71 (28 Sep 2017 05:04) (107/64 - 128/71)  BP(mean): --  ABP: --  ABP(mean): --  RR: 20 (28 Sep 2017 05:04) (18 - 20)  SpO2: 97% (28 Sep 2017 05:04) (94% - 99%)        09-26 @ 07:01 - 09-27 @ 07:00  --------------------------------------------------------  IN: 1340 mL / OUT: 1100 mL / NET: 240 mL    09-27 @ 07:01 - 09-28 @ 05:31  --------------------------------------------------------  IN: 440 mL / OUT: 1200 mL / NET: -760 mL      CAPILLARY BLOOD GLUCOSE          PHYSICAL EXAM:NAD in bed  General: Awake, alert, oriented X 3.   HEENT: Atraumatic, normocephalic.                 Mallampatti Grade 2                No nasal congestion.                No tonsillar or pharyngeal exudates.  Lymph Nodes: No palpable lymphadenopathy  Neck: No JVD. No carotid bruit.   Respiratory: Normal chest expansion                         Normal percussion                         Normal and equal air entry                         No wheeze, rhonchi or rales.  Cardiovascular: S1 S2 irregular No murmurs, rubs or gallops.   Abdomen: Soft, non-tender, non-distended. No organomegaly.  Extremities: Warm to touch. Peripheral pulse palpable. No pedal edema. Ecchymoses on right back  Skin: No rashes or skin lesions  Neurological: Motor and sensory examination equal and normal in all four extremities.  Psychiatry: Appropriate mood and affect.    HOSPITAL MEDICATIONS:  MEDICATIONS  (STANDING):  atorvastatin 10 milliGRAM(s) Oral at bedtime  finasteride 5 milliGRAM(s) Oral daily  tamsulosin 0.4 milliGRAM(s) Oral at bedtime  buDESOnide 160 MICROgram(s)/formoterol 4.5 MICROgram(s) Inhaler 2 Puff(s) Inhalation two times a day  tiotropium 18 MICROgram(s) Capsule 1 Capsule(s) Inhalation daily  fluticasone propionate 50 MICROgram(s)/spray Nasal Spray 1 Spray(s) Both Nostrils two times a day  losartan 50 milliGRAM(s) Oral daily  metoprolol succinate ER 50 milliGRAM(s) Oral two times a day  rivaroxaban 20 milliGRAM(s) Oral every 24 hours  sodium chloride 0.9%. 1000 milliLiter(s) (50 mL/Hr) IV Continuous <Continuous>    MEDICATIONS  (PRN):  acetaminophen   Tablet 650 milliGRAM(s) Oral every 6 hours PRN For Temp greater than 38 C (100.4 F)  ondansetron Injectable 4 milliGRAM(s) IV Push every 6 hours PRN Nausea  ALBUTerol    0.083% 2.5 milliGRAM(s) Nebulizer every 6 hours PRN Shortness of Breath and/or Wheezing  senna 2 Tablet(s) Oral at bedtime PRN Constipation  docusate sodium 100 milliGRAM(s) Oral daily PRN Constipation  benzonatate 100 milliGRAM(s) Oral every 8 hours PRN Cough      LABS:                        9.8    9.37  )-----------( 370      ( 26 Sep 2017 08:00 )             30.0     09-26    128<L>  |  93<L>  |  16  ----------------------------<  110<H>  5.6<H>   |  22  |  0.84    Ca    8.3<L>      26 Sep 2017 07:59          Arterial Blood Gas:  09-27 @ 10:44  7.48/32/96/23/98/.5  ABG lactate: --        MICROBIOLOGY:     RADIOLOGY:  [ ] Reviewed and interpreted by me    Point of Care Ultrasound Findings:    PFT:    EKG:

## 2017-09-28 NOTE — PROGRESS NOTE ADULT - ATTENDING COMMENTS
as above- pulm. stable; cards stable as per Jayesh et al.-moved to monitored bed--AF for DCCV aborted secondary to cough    Mr. HI has a multifactorial (asthma, post nasal drip and bronchiectasis) cough s/out any respiratory compromise from it.  ***There are no absolute pulmonary contraindications to EPS procedure-which may include intubation    increase ambulation and rehab. pending-(pulm. stable for rehab)    Norman Coughlin MD-Pulmonary   [phone]

## 2017-09-28 NOTE — PROGRESS NOTE ADULT - SUBJECTIVE AND OBJECTIVE BOX
XIMENAMAMTA THOMPSON    Patient is a 89y old  Male who presents with a chief complaint of Left Hip Pain, IT Fracture/ Fixation with IM Nail (25 Sep 2017 06:45)    Feels well at rest.  Remains in atrial flutter 80s-100s.  No chest pain, palpitations or dyspnea.    Allergies    No Known Allergies    MEDICATIONS  (STANDING):  atorvastatin 10 milliGRAM(s) Oral at bedtime  finasteride 5 milliGRAM(s) Oral daily  tamsulosin 0.4 milliGRAM(s) Oral at bedtime  buDESOnide 160 MICROgram(s)/formoterol 4.5 MICROgram(s) Inhaler 2 Puff(s) Inhalation two times a day  tiotropium 18 MICROgram(s) Capsule 1 Capsule(s) Inhalation daily  fluticasone propionate 50 MICROgram(s)/spray Nasal Spray 1 Spray(s) Both Nostrils two times a day  losartan 50 milliGRAM(s) Oral daily  metoprolol succinate ER 50 milliGRAM(s) Oral two times a day  rivaroxaban 20 milliGRAM(s) Oral every 24 hours  sodium chloride 0.9%. 1000 milliLiter(s) (50 mL/Hr) IV Continuous <Continuous>    MEDICATIONS  (PRN):  acetaminophen   Tablet 650 milliGRAM(s) Oral every 6 hours PRN For Temp greater than 38 C (100.4 F)  ondansetron Injectable 4 milliGRAM(s) IV Push every 6 hours PRN Nausea  ALBUTerol    0.083% 2.5 milliGRAM(s) Nebulizer every 6 hours PRN Shortness of Breath and/or Wheezing  senna 2 Tablet(s) Oral at bedtime PRN Constipation  docusate sodium 100 milliGRAM(s) Oral daily PRN Constipation  benzonatate 100 milliGRAM(s) Oral every 8 hours PRN Cough    PHYSICAL EXAM:  Vital Signs Last 24 Hrs  T(C): 36.4 (28 Sep 2017 05:04), Max: 36.6 (27 Sep 2017 13:15)  T(F): 97.6 (28 Sep 2017 05:04), Max: 97.9 (27 Sep 2017 13:15)  HR: 97 (28 Sep 2017 05:04) (68 - 97)  BP: 128/71 (28 Sep 2017 05:04) (107/64 - 128/71)  BP(mean): --  RR: 20 (28 Sep 2017 05:04) (18 - 20)  SpO2: 97% (28 Sep 2017 05:04) (94% - 99%)  Daily     Daily   I&O's Summary    27 Sep 2017 07:01  -  28 Sep 2017 07:00  --------------------------------------------------------  IN: 440 mL / OUT: 1200 mL / NET: -760 mL    General Appearance: 	 Alert, cooperative, no distress  Neck: no JVD  Lungs:  clear to auscultation and percussion bilaterally  Cor:  pmi 5th ICS MCL, Irregular rate and rhythm, S1 normal intensity, S2 normal intensity  Abdomen:	 soft, non-tender; bowel sounds normal; no masses,  no organomegaly  Extremities: without cyanosis, clubbing or edema    Telemetry: Atrial flutter 80-100s    Labs:  CBC Full  -  ( 28 Sep 2017 08:40 )  WBC Count : 12.96 K/uL  Hemoglobin : 10.4 g/dL  Hematocrit : 31.7 %  Platelet Count - Automated : 374 K/uL  Mean Cell Volume : 83.6 fl  Mean Cell Hemoglobin : 27.4 pg  Mean Cell Hemoglobin Concentration : 32.8 gm/dL  Auto Neutrophil # : x  Auto Lymphocyte # : x  Auto Monocyte # : x  Auto Eosinophil # : x  Auto Basophil # : x  Auto Neutrophil % : x  Auto Lymphocyte % : x  Auto Monocyte % : x  Auto Eosinophil % : x  Auto Basophil % : x    Basic Metabolic Panel in AM (09.28.17 @ 08:33)    Sodium, Serum: 126 mmol/L    Potassium, Serum: 4.6 mmol/L    Chloride, Serum: 92 mmol/L    Carbon Dioxide, Serum: 21 mmol/L    Anion Gap, Serum: 13 mmol/L    Blood Urea Nitrogen, Serum: 17 mg/dL    Creatinine, Serum: 0.89 mg/dL    Glucose, Serum: 102 mg/dL    Calcium, Total Serum: 8.1 mg/dL    eGFR if Non : 72: Interpretative comment  The units for eGFR are ml/min/1.73m2 (normalized body surface area). The  eGFR is calculated from a serum creatinine using the CKD-EPI equation.  Other variables required for calculation are race, age and sex. Among  patients w72: ith chronic kidney disease (CKD), the eGFR is useful in  determining the stage of disease according to KDOQI CKD classification.  All eGFR results are reported numerically with the following  interpretation.          GFR                    With72:                  Without     (ml/min/1.73 m2)    Kidney Damage       Kidney Damage        >= 90                    Stage 1                     Normal        60-89                    Stage 2                     Decreased GFR        :      Stage 3                     Stage 3        15-29                    Stage 4                     Stage 4        < 15                      Stage 5                     Stage 5  Each stage of CKD assumes that the associated GFR level has been in  eff72: ect for at least 3 months. Determination of stages one and two (with  eGFR > 59 ml/min/m2) requires estimation of kidney damage for at least 3  months as defined by structural or functional abnormalities.  Limitations: All estimates of GFR will be les72: s accurate for patients at  extremes of muscle mass (including but not limited to frail elderly,  critically ill, or cancer patients), those with unusual diets, and those  with conditions associated with reduced secretion or extrarenal  elimination of72:  creatinine. The eGFR equation is not recommended for use  in patients with unstable creatinine levels. mL/min/1.73M2    eGFR if African American: 83 mL/min/1.73M2    Impression/Plan: Cardiomyopathy with severe LV dysfunction, compensated  Post-op atrial flutter, now with reasonable rate control  Post op L hip surgery    Plan:  Continue rate control and anticoagulation  For cardioversion this admission    Beck Arevalo MD, FACC  [facility]

## 2017-09-28 NOTE — PROGRESS NOTE ADULT - ASSESSMENT
89 yo M with a h/o asthma, seasonal allergies, CAD, systolic CHF s/p AICD, HTN admitted s/p fall on to left hip with subsequent left hip fracture. Plan for OR for ORIF today.  Asthma history - states he is well controlled, using an Albuterol nebulizer 1-2x a day as needed.  Had an episode of acute bronchitis the end of August and is s/p course of antibiotics and Prednisone.  Currently denies SOB, wheezing or cough.  Not hypoxic, comfortable appearing and speaking in full sentences.  Left hip pain currently controlled     Asthma - mild well controlled    Post op AF--EPS follow up    Hyponatremia - unclear etiology, appears euvolemic-chronic

## 2017-09-29 VITALS
TEMPERATURE: 98 F | SYSTOLIC BLOOD PRESSURE: 111 MMHG | OXYGEN SATURATION: 97 % | HEART RATE: 59 BPM | DIASTOLIC BLOOD PRESSURE: 62 MMHG | RESPIRATION RATE: 18 BRPM

## 2017-09-29 LAB — URATE SERPL-MCNC: 4.3 MG/DL — SIGNIFICANT CHANGE UP (ref 3.4–8.8)

## 2017-09-29 PROCEDURE — 99233 SBSQ HOSP IP/OBS HIGH 50: CPT

## 2017-09-29 PROCEDURE — 93010 ELECTROCARDIOGRAM REPORT: CPT

## 2017-09-29 RX ORDER — METOPROLOL TARTRATE 50 MG
1 TABLET ORAL
Qty: 0 | Refills: 0 | COMMUNITY

## 2017-09-29 RX ADMIN — TIOTROPIUM BROMIDE 1 CAPSULE(S): 18 CAPSULE ORAL; RESPIRATORY (INHALATION) at 12:02

## 2017-09-29 RX ADMIN — FINASTERIDE 5 MILLIGRAM(S): 5 TABLET, FILM COATED ORAL at 12:02

## 2017-09-29 RX ADMIN — BUDESONIDE AND FORMOTEROL FUMARATE DIHYDRATE 2 PUFF(S): 160; 4.5 AEROSOL RESPIRATORY (INHALATION) at 06:20

## 2017-09-29 RX ADMIN — Medication 100 MILLIGRAM(S): at 06:20

## 2017-09-29 RX ADMIN — Medication 1 SPRAY(S): at 06:21

## 2017-09-29 RX ADMIN — LOSARTAN POTASSIUM 50 MILLIGRAM(S): 100 TABLET, FILM COATED ORAL at 06:20

## 2017-09-29 RX ADMIN — Medication 50 MILLIGRAM(S): at 06:20

## 2017-09-29 NOTE — PROGRESS NOTE ADULT - PROBLEM SELECTOR PLAN 1
multifactorial--asthma, poor balance, CAD
Continue metoprolol; rate improved. Increase xarelto 20mg per day. EP consultation to consider D/C cardioversion vs. observation.
Continue xarelto, toprol. For cardioversion today. To change to toprol XL 50mg per day after procedure.
NSR post-cardioversion. Continue xarelto and toprol.
Resume losartan 50mg per day and toprol XL 25mg per day. Monitor I/O and D/C IVF.
To obtain EKG. Increase metoprolol XL 50mg per day. If patient in AFIB will increase xarelto to 15mg per day.
continue physical therapy  DVT and GI prophylaxis
continue physical therapy  DVT and GI prophylaxis  pain med as needed
multifactorial--asthma, poor balance, CAD
s/p ORIF  follow BP  DVT and GI prophylaxis
multifactorial--asthma, poor balance, CAD
No CHF on exam. Continue losartan and toprol. Off IVF. Monitor exam, I/O.
continue physical therapy  DVT and GI prophylaxis  pain med as needed

## 2017-09-29 NOTE — PROGRESS NOTE ADULT - ASSESSMENT
Patient with hip fracture treated with locking intramedullary jay jay. Patient is feeling well a lttile tired and now needs to get back on his feet Denies chest pain SoB, palpitations. Recovery and ambulation compromised by her advanced age. Was on postoperative xarelto for hip fracture, plus chronic atrial fibrillation, held for a possible cardiac electrical versus chemical cardioversion.. Chronic hyponatremia, of no clinical significance, at present.  Xarelto held for possible cardioversion. Cardioversion canceled due to suggested bronchitis.  Patient underwent cardioversion and is now NS.

## 2017-09-29 NOTE — PROGRESS NOTE ADULT - SUBJECTIVE AND OBJECTIVE BOX
CHIEF COMPLAINT:    Interval Events:    REVIEW OF SYSTEMS:  Constitutional: No fevers or chills. No weight loss. No fatigue or generalized malaise.  Eyes: No itching or discharge from the eyes  ENT: No ear pain. No ear discharge. No nasal congestion. No post nasal drip. No epistaxis. No throat pain. No sore throat. No difficulty swallowing.   CV: No chest pain. No palpitations. No lightheadedness or dizziness.   Resp: No dyspnea at rest. No dyspnea on exertion. No orthopnea. No wheezing. No cough. No stridor. No sputum production. No chest pain with respiration.  GI: No nausea. No vomiting. No diarrhea.  MSK: No joint pain or pain in any extremities  Integumentary: No skin lesions. No pedal edema.  Neurological: No gross motor weakness. No sensory changes.  [ ] All other systems negative  [ ] Unable to assess ROS because ________    OBJECTIVE:  ICU Vital Signs Last 24 Hrs  T(C): 36.4 (29 Sep 2017 04:25), Max: 36.6 (28 Sep 2017 12:18)  T(F): 97.6 (29 Sep 2017 04:25), Max: 97.8 (28 Sep 2017 12:18)  HR: 61 (29 Sep 2017 04:25) (61 - 97)  BP: 125/68 (29 Sep 2017 04:25) (103/66 - 128/71)  BP(mean): --  ABP: --  ABP(mean): --  RR: 18 (29 Sep 2017 04:25) (17 - 20)  SpO2: 96% (29 Sep 2017 04:25) (96% - 98%)        09-27 @ 07:01 - 09-28 @ 07:00  --------------------------------------------------------  IN: 440 mL / OUT: 1200 mL / NET: -760 mL    09-28 @ 07:01 - 09-29 @ 05:03  --------------------------------------------------------  IN: 240 mL / OUT: 1050 mL / NET: -810 mL      CAPILLARY BLOOD GLUCOSE          PHYSICAL EXAM:  General: Awake, alert, oriented X 3.   HEENT: Atraumatic, normocephalic.                 Mallampatti Grade                 No nasal congestion.                No tonsillar or pharyngeal exudates.  Lymph Nodes: No palpable lymphadenopathy  Neck: No JVD. No carotid bruit.   Respiratory: Normal chest expansion                         Normal percussion                         Normal and equal air entry                         No wheeze, rhonchi or rales.  Cardiovascular: S1 S2 normal. No murmurs, rubs or gallops.   Abdomen: Soft, non-tender, non-distended. No organomegaly.  Extremities: Warm to touch. Peripheral pulse palpable. No pedal edema.   Skin: No rashes or skin lesions  Neurological: Motor and sensory examination equal and normal in all four extremities.  Psychiatry: Appropriate mood and affect.    HOSPITAL MEDICATIONS:  MEDICATIONS  (STANDING):  atorvastatin 10 milliGRAM(s) Oral at bedtime  finasteride 5 milliGRAM(s) Oral daily  tamsulosin 0.4 milliGRAM(s) Oral at bedtime  buDESOnide 160 MICROgram(s)/formoterol 4.5 MICROgram(s) Inhaler 2 Puff(s) Inhalation two times a day  tiotropium 18 MICROgram(s) Capsule 1 Capsule(s) Inhalation daily  fluticasone propionate 50 MICROgram(s)/spray Nasal Spray 1 Spray(s) Both Nostrils two times a day  losartan 50 milliGRAM(s) Oral daily  metoprolol succinate ER 50 milliGRAM(s) Oral two times a day  rivaroxaban 20 milliGRAM(s) Oral every 24 hours  sodium chloride 0.9%. 1000 milliLiter(s) (50 mL/Hr) IV Continuous <Continuous>    MEDICATIONS  (PRN):  acetaminophen   Tablet 650 milliGRAM(s) Oral every 6 hours PRN For Temp greater than 38 C (100.4 F)  ondansetron Injectable 4 milliGRAM(s) IV Push every 6 hours PRN Nausea  ALBUTerol    0.083% 2.5 milliGRAM(s) Nebulizer every 6 hours PRN Shortness of Breath and/or Wheezing  senna 2 Tablet(s) Oral at bedtime PRN Constipation  docusate sodium 100 milliGRAM(s) Oral daily PRN Constipation  benzonatate 100 milliGRAM(s) Oral every 8 hours PRN Cough      LABS:                        10.4   12.96 )-----------( 374      ( 28 Sep 2017 08:40 )             31.7     09-28    126<L>  |  92<L>  |  17  ----------------------------<  102<H>  4.6   |  21<L>  |  0.89    Ca    8.1<L>      28 Sep 2017 08:33          Arterial Blood Gas:  09-27 @ 10:44  7.48/32/96/23/98/.5  ABG lactate: --        MICROBIOLOGY:     RADIOLOGY:  [ ] Reviewed and interpreted by me    Point of Care Ultrasound Findings:    PFT:    EKG: CHIEF COMPLAINT:mildcough-poor sleep    Interval Events:s/p EPS proceedure;constipated    REVIEW OF SYSTEMS:  Constitutional: No fevers or chills. No weight loss. No fatigue or generalized malaise.  Eyes: No itching or discharge from the eyes  ENT: No ear pain. No ear discharge. No nasal congestion. No post nasal drip. No epistaxis. No throat pain. No sore throat. No difficulty swallowing.   CV: No chest pain. No palpitations. No lightheadedness or dizziness.   Resp: No dyspnea at rest. No dyspnea on exertion. No orthopnea. No wheezing. mild cough. No stridor. No sputum production. No chest pain with respiration.  GI: No nausea. No vomiting. No diarrhea.  MSK: No joint pain or pain in any extremities  Integumentary: No skin lesions. No pedal edema.  Neurological: No gross motor weakness. No sensory changes.  [+ ] All other systems negative  [ ] Unable to assess ROS because ________    OBJECTIVE:  ICU Vital Signs Last 24 Hrs  T(C): 36.4 (29 Sep 2017 04:25), Max: 36.6 (28 Sep 2017 12:18)  T(F): 97.6 (29 Sep 2017 04:25), Max: 97.8 (28 Sep 2017 12:18)  HR: 61 (29 Sep 2017 04:25) (61 - 97)  BP: 125/68 (29 Sep 2017 04:25) (103/66 - 128/71)  BP(mean): --  ABP: --  ABP(mean): --  RR: 18 (29 Sep 2017 04:25) (17 - 20)  SpO2: 96% (29 Sep 2017 04:25) (96% - 98%)        09-27 @ 07:01 - 09-28 @ 07:00  --------------------------------------------------------  IN: 440 mL / OUT: 1200 mL / NET: -760 mL    09-28 @ 07:01 - 09-29 @ 05:03  --------------------------------------------------------  IN: 240 mL / OUT: 1050 mL / NET: -810 mL      CAPILLARY BLOOD GLUCOSE          PHYSICAL EXAM:NAD in bed  General: Awake, alert, oriented X 3.   HEENT: Atraumatic, normocephalic.                 Mallampatti Grade 2                No nasal congestion.                No tonsillar or pharyngeal exudates.  Lymph Nodes: No palpable lymphadenopathy  Neck: No JVD. No carotid bruit.   Respiratory: Normal chest expansion                         Normal percussion                         Normal and equal air entry                         No wheeze, rhonchi or rales.  Cardiovascular: S1 S2 normal. No murmurs, rubs or gallops.   Abdomen: Soft, non-tender, non-distended. No organomegaly.  Extremities: Warm to touch. Peripheral pulse palpable. + left pedal edema.   Skin: No rashes or skin lesions  Neurological: Motor and sensory examination equal and normal in all four extremities.  Psychiatry: Appropriate mood and affect.    HOSPITAL MEDICATIONS:  MEDICATIONS  (STANDING):  atorvastatin 10 milliGRAM(s) Oral at bedtime  finasteride 5 milliGRAM(s) Oral daily  tamsulosin 0.4 milliGRAM(s) Oral at bedtime  buDESOnide 160 MICROgram(s)/formoterol 4.5 MICROgram(s) Inhaler 2 Puff(s) Inhalation two times a day  tiotropium 18 MICROgram(s) Capsule 1 Capsule(s) Inhalation daily  fluticasone propionate 50 MICROgram(s)/spray Nasal Spray 1 Spray(s) Both Nostrils two times a day  losartan 50 milliGRAM(s) Oral daily  metoprolol succinate ER 50 milliGRAM(s) Oral two times a day  rivaroxaban 20 milliGRAM(s) Oral every 24 hours  sodium chloride 0.9%. 1000 milliLiter(s) (50 mL/Hr) IV Continuous <Continuous>    MEDICATIONS  (PRN):  acetaminophen   Tablet 650 milliGRAM(s) Oral every 6 hours PRN For Temp greater than 38 C (100.4 F)  ondansetron Injectable 4 milliGRAM(s) IV Push every 6 hours PRN Nausea  ALBUTerol    0.083% 2.5 milliGRAM(s) Nebulizer every 6 hours PRN Shortness of Breath and/or Wheezing  senna 2 Tablet(s) Oral at bedtime PRN Constipation  docusate sodium 100 milliGRAM(s) Oral daily PRN Constipation  benzonatate 100 milliGRAM(s) Oral every 8 hours PRN Cough      LABS:                        10.4   12.96 )-----------( 374      ( 28 Sep 2017 08:40 )             31.7     09-28    126<L>  |  92<L>  |  17  ----------------------------<  102<H>  4.6   |  21<L>  |  0.89    Ca    8.1<L>      28 Sep 2017 08:33          Arterial Blood Gas:  09-27 @ 10:44  7.48/32/96/23/98/.5  ABG lactate: --        MICROBIOLOGY:     RADIOLOGY:  [ ] Reviewed and interpreted by me    Point of Care Ultrasound Findings:    PFT:    EKG:

## 2017-09-29 NOTE — PROGRESS NOTE ADULT - PROBLEM SELECTOR PLAN 6
D/C planning to [facility] after EKG reviewed.
check uric acid level to evaluate for SIADH
check uric acid level to evaluate for SIADH
s/p fx--pain control
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
## 2017-09-29 NOTE — PROVIDER CONTACT NOTE (OTHER) - ACTION/TREATMENT ORDERED:
DO Justin aware, awaiting provider to RN order to give nebulizer treatment early. Will continue to monitor pt.
MD made aware. no new orders at this time. Will continue to monitor.
Provider Johnny Stephenson aware, no further interventions at this time. Will continue to monitor pt on tele.
12 lead ekg done, no new orders. Will continue to monitor.
DO Justin aware, awaiting AM lab orders as per DO. Will continue to monitor pt.

## 2017-09-29 NOTE — PROGRESS NOTE ADULT - SUBJECTIVE AND OBJECTIVE BOX
Patient is a 89y old  Male who presents with a chief complaint of Left Hip Pain, IT Fracture/ Fixation with IM Nail (25 Sep 2017 06:45)    He underwent successful cardioversion yesterday. He feels well. Denies CP/SOB or palpitations.     Allergies : No Known Allergies      MEDICATIONS  (STANDING):  atorvastatin 10 milliGRAM(s) Oral at bedtime  finasteride 5 milliGRAM(s) Oral daily  tamsulosin 0.4 milliGRAM(s) Oral at bedtime  buDESOnide 160 MICROgram(s)/formoterol 4.5 MICROgram(s) Inhaler 2 Puff(s) Inhalation two times a day  tiotropium 18 MICROgram(s) Capsule 1 Capsule(s) Inhalation daily  fluticasone propionate 50 MICROgram(s)/spray Nasal Spray 1 Spray(s) Both Nostrils two times a day  losartan 50 milliGRAM(s) Oral daily  metoprolol succinate ER 50 milliGRAM(s) Oral two times a day  rivaroxaban 20 milliGRAM(s) Oral every 24 hours  sodium chloride 0.9%. 1000 milliLiter(s) (50 mL/Hr) IV Continuous <Continuous>    MEDICATIONS  (PRN):  acetaminophen   Tablet 650 milliGRAM(s) Oral every 6 hours PRN For Temp greater than 38 C (100.4 F)  ondansetron Injectable 4 milliGRAM(s) IV Push every 6 hours PRN Nausea  ALBUTerol    0.083% 2.5 milliGRAM(s) Nebulizer every 6 hours PRN Shortness of Breath and/or Wheezing  senna 2 Tablet(s) Oral at bedtime PRN Constipation  docusate sodium 100 milliGRAM(s) Oral daily PRN Constipation  benzonatate 100 milliGRAM(s) Oral every 8 hours PRN Cough      PHYSICAL EXAM:  Vital Signs Last 24 Hrs  T(C): 36.6 (29 Sep 2017 06:18), Max: 36.6 (28 Sep 2017 12:18)  T(F): 97.8 (29 Sep 2017 06:18), Max: 97.8 (28 Sep 2017 12:18)  HR: 65 (29 Sep 2017 06:18) (61 - 92)  BP: 126/73 (29 Sep 2017 06:18) (103/66 - 126/73)  BP(mean): --  RR: 17 (29 Sep 2017 06:18) (17 - 18)  SpO2: 95% (29 Sep 2017 06:18) (95% - 98%)  Daily     Daily   I&O's Summary    27 Sep 2017 07:01  -  28 Sep 2017 07:00  --------------------------------------------------------  IN: 440 mL / OUT: 1200 mL / NET: -760 mL    28 Sep 2017 07:01  -  29 Sep 2017 06:51  --------------------------------------------------------  IN: 240 mL / OUT: 1050 mL / NET: -810 mL    General Appearance: 	 Alert, cooperative  HEENT: normocephalic, atraumatic  Neck: no JVD,  carotid 2+  bilaterally without bruits  Lungs:  clear to auscultation and percussion bilaterally  Cor:  pmi 5th ICS MCL, regular rate and rhythm, S1 normal intensity, S2 normal intensity, no gallops, Grader I/VI apical systolic murmur  Abdomen: soft, non-tender; bowel sounds normal; no masses,  no organomegaly  Extremities: without cyanosis, clubbing or edema  Vasc: 2-+ PT and DP pulses; no varicosities  Neurologic: A&O x 3 (time, place, person).     Telemetry: NSR 60/min, brief PAT    Labs:  CBC Full  -  ( 28 Sep 2017 08:40 )  WBC Count : 12.96 K/uL  Hemoglobin : 10.4 g/dL  Hematocrit : 31.7 %  Platelet Count - Automated : 374 K/uL  Mean Cell Volume : 83.6 fl  Mean Cell Hemoglobin : 27.4 pg  Mean Cell Hemoglobin Concentration : 32.8 gm/dL  Auto Neutrophil # : x  Auto Lymphocyte # : x  Auto Monocyte # : x  Auto Eosinophil # : x  Auto Basophil # : x  Auto Neutrophil % : x  Auto Lymphocyte % : x  Auto Monocyte % : x  Auto Eosinophil % : x  Auto Basophil % : x    09-28    126<L>  |  92<L>  |  17  ----------------------------<  102<H>  4.6   |  21<L>  |  0.89    Ca    8.1<L>      28 Sep 2017 08:33                              Carlo Mcconnell MD [facility]  [phone]

## 2017-09-29 NOTE — PROVIDER CONTACT NOTE (OTHER) - BACKGROUND
Pt admitted for closed fracture of femur. Pt has hx of atrial fibrillation on cardiac monitor during this admission.

## 2017-09-29 NOTE — PROGRESS NOTE ADULT - PROBLEM SELECTOR PLAN 4
Transferred to tele   awaiting cardioversion  Metoprolol increased by cardiology  Patient was successfully cardioverted to nsr.

## 2017-09-29 NOTE — PROGRESS NOTE ADULT - PROBLEM SELECTOR PLAN 2
symbicort 160 bid  spiriva 1 puff q day  albuterol q 6 h prn  pulmonary toilet-incentive spirometry, Chest Pt-acapella/chest vest-up to 5 times per day.    maintain oxygen levels above 90%-supplemental oxygen via nasal canula-humidified    All nebulized therapy is to be administered via hand held nebulizer-(patient must gargle and spit with water after use)
As above to resume CV meds. Off aspirin due to use of xarelto.
BP stable
D/C IVF after cardioversion. Monitor I/O.
No CHF. Continue ARB and beta blocker.
No CHF. Continue beta blocker and ARB.
No CHF. Fluid restrict. Continue beta blocker and ARB.
currently hypotensive  hold meds and gently hydrate
symbicort 160 bid  spiriva 1 puff q day  albuterol q 6 h prn  pulmonary toilet-incentive spirometry, Chest Pt-acapella/chest vest-up to 5 times per day.    maintain oxygen levels above 90%-supplemental oxygen via nasal canula-humidified    All nebulized therapy is to be administered via hand held nebulizer-(patient must gargle and spit with water after use)
No anginal symptoms. Off aspirin due to use of xarelto and age. Continue other CV meds.
BP stable

## 2017-09-29 NOTE — PROGRESS NOTE ADULT - PROVIDER SPECIALTY LIST ADULT
Cardiology
Electrophysiology
Internal Medicine
Orthopedics
Pulmonology
SICU
Pulmonology
Cardiology
Pulmonology

## 2017-09-29 NOTE — PROVIDER CONTACT NOTE (OTHER) - ASSESSMENT
Pt is A&Ox4 denies chest pain, dizziness, palpitations, and SOB. HR 65, /73, RR 17, SpO2 95% on room air. Pt has been in sinus rhythm on cardiac monitor since cardioversion.

## 2017-09-29 NOTE — PROVIDER CONTACT NOTE (OTHER) - BACKGROUND
Admitted for closed fracture of neck of femur. hx of atrial flutter s/p cardioversion., CAD, arrhythmia and CHF.

## 2017-09-29 NOTE — PROGRESS NOTE ADULT - ATTENDING COMMENTS
as above- pulm. stable; cards stable as per Jayesh et al.-moved to monitored bed--AF for DCCV aborted secondary to cough    Mr. HI has a multifactorial (asthma, post nasal drip and bronchiectasis) cough s/out any respiratory compromise from it.  ***There are no absolute pulmonary contraindications to EPS procedure-which may include intubation    increase ambulation and rehab. pending-(pulm. stable for rehab)    Norman Coughlin MD-Pulmonary   [phone] as above- pulm. stable; cards stable as per Jayesh et al.-moved to monitored bed--AF s/p DCCV done 9/28    constipation--bowel regimen     increase ambulation and rehab. pending-(pulm. stable for rehab)-as per cards    Norman Coughlin MD-Pulmonary   [phone]

## 2017-09-29 NOTE — PROGRESS NOTE ADULT - SUBJECTIVE AND OBJECTIVE BOX
Patient is a 89y old  Male who presents with a chief complaint of Left Hip Pain, IT Fracture/ Fixation with IM Nail (25 Sep 2017 06:45)      HPI:  Patient has no c/o S/P D/C cardio version.        Denies chest pain nausea vomiting .  Patient is comfortable,  Pain 2/10 s/p orthopedic  surgery. Denies cough congestions fever or chills    MEDICATIONS  (STANDING):  atorvastatin 10 milliGRAM(s) Oral at bedtime  finasteride 5 milliGRAM(s) Oral daily  tamsulosin 0.4 milliGRAM(s) Oral at bedtime  buDESOnide 160 MICROgram(s)/formoterol 4.5 MICROgram(s) Inhaler 2 Puff(s) Inhalation two times a day  tiotropium 18 MICROgram(s) Capsule 1 Capsule(s) Inhalation daily  fluticasone propionate 50 MICROgram(s)/spray Nasal Spray 1 Spray(s) Both Nostrils two times a day  losartan 50 milliGRAM(s) Oral daily  metoprolol succinate ER 50 milliGRAM(s) Oral two times a day  rivaroxaban 20 milliGRAM(s) Oral every 24 hours  sodium chloride 0.9%. 1000 milliLiter(s) (50 mL/Hr) IV Continuous <Continuous>    MEDICATIONS  (PRN):  acetaminophen   Tablet 650 milliGRAM(s) Oral every 6 hours PRN For Temp greater than 38 C (100.4 F)  ondansetron Injectable 4 milliGRAM(s) IV Push every 6 hours PRN Nausea  ALBUTerol    0.083% 2.5 milliGRAM(s) Nebulizer every 6 hours PRN Shortness of Breath and/or Wheezing  senna 2 Tablet(s) Oral at bedtime PRN Constipation  docusate sodium 100 milliGRAM(s) Oral daily PRN Constipation  benzonatate 100 milliGRAM(s) Oral every 8 hours PRN Cough      Allergies    No Known Allergies    Intolerances        REVIEW OF SYSTEM:  CONSTITUTIONAL: No fever, No change in weight, No fatigue  HEAD: No headache, No dizziness, No recent trauma  EYES: No eye pain, No visual disturbances, No discharge  ENT:  No difficulty hearing, No tinnitus, No vertigo, No sinus pain, No throat pain  NECK: No pain, No stiffness  BREASTS: No pain, No masses, No nipple discharge  RESPIRATORY: No cough, No wheezing, No chills, No hemoptysis, No shortness of breath at rest or exertional shortness of breath  CARDIOVASCULAR: No chest pain, No palpitations, No dizziness, No CHF, No arrhythmia, No cardiomegaly, No leg swelling  GASTROINTESTINAL: No abdominal, No epigastric pain. No nausea, No vomiting, No hematemesis, No diarrhea, No constipation. No melena, No hematochezia. No GERD  GENITOURINARY: No dysuria, No frequency, No hematuria, No incontinence, No nocturia, No hesitancy,  SKIN: No itching, No burning, No rashes, No lesions   LYMPH NODES: No history of enlarged glands  ENDOCRINE: No heat or cold intolerance, No hair loss. No osteoporosis, No thyroid disease  MUSCULOSKELETAL: No joint pain or swelling, No muscle, back, or extremity pain  PSYCHIATRIC: No depression, No anxiety, No mood swings, No difficulty sleeping  HEME/LYMPH: No easy bruising, No anticoagulants, No bleeding disorder, No bleeding gums  ALLERGY AND IMMUNOLOGIC: No hives, No eczema  NEUROLOGICAL: No memory loss, No loss of strength, No numbness, No tremors        VITALS:   T(C): 36.7 (09-29-17 @ 12:42), Max: 36.7 (09-29-17 @ 12:42)  HR: 59 (09-29-17 @ 12:42) (59 - 75)  BP: 111/62 (09-29-17 @ 12:42) (103/66 - 126/73)  RR: 18 (09-29-17 @ 12:42) (17 - 18)  SpO2: 97% (09-29-17 @ 12:42) (95% - 98%)  Wt(kg): --    09-28 @ 07:01  -  09-29 @ 07:00  --------------------------------------------------------  IN: 240 mL / OUT: 1050 mL / NET: -810 mL    09-29 @ 07:01  -  09-29 @ 15:12  --------------------------------------------------------  IN: 575 mL / OUT: 0 mL / NET: 575 mL        PHYSICAL EXAM:  GENERAL: NAD, well nourished and conversant  HEAD:  Atraumatic  EYES: EOM, PERRLA, conjunctiva pink and sclera white  ENT: No tonsillar erythema, exudates, or enlargement, moist mucous membranes, good dentition, no lesions  NECK: Supple, No JVD, normal thyroid, carotids with normal upstrokes and no bruits  CHEST/LUNG: Clear to auscultation bilaterally, No rales, rhonchi, wheezing, or rubs  HEART: Regular rate and rhythm, No murmurs, rubs, or gallops  ABDOMEN: Soft, nondistended, no masses, guarding, tenderness or rebound, bowel sounds present  EXTREMITIES:  2+ Peripheral Pulses, No clubbing, cyanosis, or edema. No arthritis of shoulders, elbows, hands, hips, knees, ankles, or feet. No DJD C spine, T spine, or L/S spine  LYMPH: No lymphadenopathy noted  SKIN: No rashes or lesions  NERVOUS SYSTEM:  Alert & Oriented X3, normal cognitive function. Motor Strength 5/5 right upper and right lower.  5/5 left upper and left lower extremities, DTRs 2+ intact and symmetric    LABS:                          10.4   12.96 )-----------( 374      ( 28 Sep 2017 08:40 )             31.7     09-28    126<L>  |  92<L>  |  17  ----------------------------<  102<H>  4.6   |  21<L>  |  0.89    Ca    8.1<L>      28 Sep 2017 08:33      CAPILLARY BLOOD GLUCOSE          RADIOLOGY & ADDITIONAL TESTS:      Consultant(s):    Care Discussed with Consultants/Other Providers [ ] YES  [ ] NO

## 2017-09-29 NOTE — PROGRESS NOTE ADULT - SUBJECTIVE AND OBJECTIVE BOX
Pt seen and examined; pain controlled, cardioverted yesterday, no acute events overnight    NAD  LLE dressing c/d/i, removed today, insicion sites c/d/i with staples  +PF/DF  SILT at foot  WWP        89M s/p L hip IMN POD9  1. Pain control  2. DVT ppx  3. WBAT/PT/OT/OOB  4. IS  5. Fluid restricted 1200 cc  6. sp cardioversion, per cards patient stable for dc  7. per pulm patient stable for dc  8. plan for dc to rehab today

## 2017-09-29 NOTE — PROGRESS NOTE ADULT - ASSESSMENT
89 yo M with a h/o asthma, seasonal allergies, CAD, systolic CHF s/p AICD, HTN admitted s/p fall on to left hip with subsequent left hip fracture. Plan for OR for ORIF today.  Asthma history - states he is well controlled, using an Albuterol nebulizer 1-2x a day as needed.  Had an episode of acute bronchitis the end of August and is s/p course of antibiotics and Prednisone.  Currently denies SOB, wheezing or cough.  Not hypoxic, comfortable appearing and speaking in full sentences.  Left hip pain currently controlled     Asthma - mild well controlled    Post op AF--EPS follow up    Hyponatremia - unclear etiology, appears euvolemic-chronic 89 yo M with a h/o asthma, seasonal allergies, CAD, systolic CHF s/p AICD, HTN admitted s/p fall on to left hip with subsequent left hip fracture. Plan for OR for ORIF today.  Asthma history - states he is well controlled, using an Albuterol nebulizer 1-2x a day as needed.  Had an episode of acute bronchitis the end of August and is s/p course of antibiotics and Prednisone.  Currently denies SOB, wheezing or cough.  Not hypoxic, comfortable appearing and speaking in full sentences.  Left hip pain currently controlled     Asthma - mild well controlled    Post op AF--EPS follow up-s/p DCCV    Hyponatremia - unclear etiology, appears euvolemic-chronic

## 2017-09-29 NOTE — PROGRESS NOTE ADULT - PROBLEM SELECTOR PLAN 3
follow for signs of urinary retention
flonase 1 sniff bid
Continue statin therapy.
Off aspirin due to use of NOAC.
Off aspirin while on NOAC. Stable.
Off aspirin while on xarelto.
Off aspirin with use of NOAC. Continue other CV meds as outlined.
flonase 1 sniff bid
follow for signs of urinary retention
flonase 1 sniff bid
Continue statin.

## 2017-09-29 NOTE — PROGRESS NOTE ADULT - NSHPATTENDINGPLANDISCUSS_GEN_ALL_CORE
patient.
Patient and family
patient.
Patient and family
Patient and family
patient and orthopedic team.

## 2017-09-29 NOTE — PROGRESS NOTE ADULT - PROBLEM SELECTOR PLAN 4
as per Jayesh et al--transfered to monitored bed--for DC CV, xarelto boosted as per Jayesh et al--transfered to monitored bed--s/p DC CV, xarelto boosted

## 2017-09-29 NOTE — PROGRESS NOTE ADULT - PROBLEM SELECTOR PROBLEM 4
Arrhythmia
Hyperlipidemia
Arrhythmia
Asthma
Bronchitis
CAD (coronary artery disease)
Closed fracture of left hip, initial encounter
Hyponatremia
Hyponatremia
Pain
CAD (coronary artery disease)
Hip fracture

## 2017-09-29 NOTE — PROGRESS NOTE ADULT - PROBLEM SELECTOR PROBLEM 2
Asthma
Chronic systolic CHF (congestive heart failure)
Asthma
CAD (coronary artery disease)
Chronic systolic CHF (congestive heart failure)
HTN (hypertension)
Asthma
CAD (coronary artery disease)
HTN (hypertension)

## 2017-09-29 NOTE — PROGRESS NOTE ADULT - PROBLEM SELECTOR PROBLEM 3
HTN (hypertension)
Allergic rhinitis
BPH (benign prostatic hypertrophy)
CAD (coronary artery disease)
Hyperlipidemia
Allergic rhinitis
Hyperlipidemia
BPH (benign prostatic hypertrophy)

## 2017-09-29 NOTE — PROGRESS NOTE ADULT - PROBLEM SELECTOR PROBLEM 6
Hip fracture
Hyponatremia
Hyponatremia
Hip fracture

## 2017-09-29 NOTE — PROGRESS NOTE ADULT - PROBLEM SELECTOR PROBLEM 1
SOB (shortness of breath)
Arrhythmia
Atrial flutter
CAD (coronary artery disease)
Chronic systolic CHF (congestive heart failure)
Hip fracture
SOB (shortness of breath)
Chronic systolic CHF (congestive heart failure)
Hip fracture

## 2017-09-29 NOTE — PROGRESS NOTE ADULT - ASSESSMENT
89y Male with history of CAD S/P AWMI remote with LVEF 20-25%, chronic systolic CHF, AICD, essential hypertension, hyperlipidemia and reactive airways admitted after a mechanical fall with left hip fracture with post-operative atrial flutter. He underwent successful cardioversion. He remains NSR without CHF. He should remain on xarelto 20mg once per day for 3-4 weeks. He is stable and cleared to transfer to [facility].

## 2017-09-29 NOTE — PROGRESS NOTE ADULT - PROBLEM SELECTOR PROBLEM 5
Asthma
Bronchitis
Asthma
Bronchitis
Bronchitis
Closed fracture of left hip, initial encounter
Closed fracture of left hip, initial encounter
Hip fracture
Hyponatremia

## 2017-10-12 ENCOUNTER — APPOINTMENT (OUTPATIENT)
Dept: UROLOGY | Facility: CLINIC | Age: 82
End: 2017-10-12

## 2017-10-19 PROCEDURE — 97162 PT EVAL MOD COMPLEX 30 MIN: CPT

## 2017-10-19 PROCEDURE — 97116 GAIT TRAINING THERAPY: CPT

## 2017-10-19 PROCEDURE — C1769: CPT

## 2017-10-19 PROCEDURE — 36430 TRANSFUSION BLD/BLD COMPNT: CPT

## 2017-10-19 PROCEDURE — 82947 ASSAY GLUCOSE BLOOD QUANT: CPT

## 2017-10-19 PROCEDURE — 94640 AIRWAY INHALATION TREATMENT: CPT

## 2017-10-19 PROCEDURE — C1713: CPT

## 2017-10-19 PROCEDURE — 93306 TTE W/DOPPLER COMPLETE: CPT

## 2017-10-19 PROCEDURE — 86923 COMPATIBILITY TEST ELECTRIC: CPT

## 2017-10-19 PROCEDURE — 93005 ELECTROCARDIOGRAM TRACING: CPT

## 2017-10-19 PROCEDURE — 82330 ASSAY OF CALCIUM: CPT

## 2017-10-19 PROCEDURE — 85730 THROMBOPLASTIN TIME PARTIAL: CPT

## 2017-10-19 PROCEDURE — P9011: CPT

## 2017-10-19 PROCEDURE — 82435 ASSAY OF BLOOD CHLORIDE: CPT

## 2017-10-19 PROCEDURE — 71045 X-RAY EXAM CHEST 1 VIEW: CPT

## 2017-10-19 PROCEDURE — 86901 BLOOD TYPING SEROLOGIC RH(D): CPT

## 2017-10-19 PROCEDURE — 73502 X-RAY EXAM HIP UNI 2-3 VIEWS: CPT

## 2017-10-19 PROCEDURE — 80053 COMPREHEN METABOLIC PANEL: CPT

## 2017-10-19 PROCEDURE — 92960 CARDIOVERSION ELECTRIC EXT: CPT

## 2017-10-19 PROCEDURE — 85610 PROTHROMBIN TIME: CPT

## 2017-10-19 PROCEDURE — 83735 ASSAY OF MAGNESIUM: CPT

## 2017-10-19 PROCEDURE — 82565 ASSAY OF CREATININE: CPT

## 2017-10-19 PROCEDURE — 81001 URINALYSIS AUTO W/SCOPE: CPT

## 2017-10-19 PROCEDURE — C1889: CPT

## 2017-10-19 PROCEDURE — 99285 EMERGENCY DEPT VISIT HI MDM: CPT | Mod: 25

## 2017-10-19 PROCEDURE — 84295 ASSAY OF SERUM SODIUM: CPT

## 2017-10-19 PROCEDURE — 84550 ASSAY OF BLOOD/URIC ACID: CPT

## 2017-10-19 PROCEDURE — 97110 THERAPEUTIC EXERCISES: CPT

## 2017-10-19 PROCEDURE — C8925: CPT

## 2017-10-19 PROCEDURE — 96374 THER/PROPH/DIAG INJ IV PUSH: CPT

## 2017-10-19 PROCEDURE — 76000 FLUOROSCOPY <1 HR PHYS/QHP: CPT

## 2017-10-19 PROCEDURE — 80048 BASIC METABOLIC PNL TOTAL CA: CPT

## 2017-10-19 PROCEDURE — 85027 COMPLETE CBC AUTOMATED: CPT

## 2017-10-19 PROCEDURE — 84132 ASSAY OF SERUM POTASSIUM: CPT

## 2017-10-19 PROCEDURE — 84100 ASSAY OF PHOSPHORUS: CPT

## 2017-10-19 PROCEDURE — 73552 X-RAY EXAM OF FEMUR 2/>: CPT

## 2017-10-19 PROCEDURE — 82803 BLOOD GASES ANY COMBINATION: CPT

## 2017-10-19 PROCEDURE — 86850 RBC ANTIBODY SCREEN: CPT

## 2017-10-19 PROCEDURE — 86900 BLOOD TYPING SEROLOGIC ABO: CPT

## 2017-10-19 PROCEDURE — 83605 ASSAY OF LACTIC ACID: CPT

## 2017-10-19 PROCEDURE — 85014 HEMATOCRIT: CPT

## 2017-10-19 PROCEDURE — 93320 DOPPLER ECHO COMPLETE: CPT

## 2017-10-19 PROCEDURE — 93325 DOPPLER ECHO COLOR FLOW MAPG: CPT

## 2017-10-26 ENCOUNTER — MEDICATION RENEWAL (OUTPATIENT)
Age: 82
End: 2017-10-26

## 2017-10-26 ENCOUNTER — APPOINTMENT (OUTPATIENT)
Dept: PULMONOLOGY | Facility: CLINIC | Age: 82
End: 2017-10-26

## 2017-10-26 ENCOUNTER — APPOINTMENT (OUTPATIENT)
Dept: PULMONOLOGY | Facility: CLINIC | Age: 82
End: 2017-10-26
Payer: MEDICARE

## 2017-10-26 VITALS
BODY MASS INDEX: 23.49 KG/M2 | SYSTOLIC BLOOD PRESSURE: 120 MMHG | DIASTOLIC BLOOD PRESSURE: 65 MMHG | RESPIRATION RATE: 14 BRPM | HEIGHT: 65 IN | HEART RATE: 65 BPM | WEIGHT: 141 LBS | OXYGEN SATURATION: 89 %

## 2017-10-26 DIAGNOSIS — J30.9 ALLERGIC RHINITIS, UNSPECIFIED: ICD-10-CM

## 2017-10-26 DIAGNOSIS — J47.9 BRONCHIECTASIS, UNCOMPLICATED: ICD-10-CM

## 2017-10-26 DIAGNOSIS — Z72.820 SLEEP DEPRIVATION: ICD-10-CM

## 2017-10-26 DIAGNOSIS — J45.909 UNSPECIFIED ASTHMA, UNCOMPLICATED: ICD-10-CM

## 2017-10-26 DIAGNOSIS — R06.02 SHORTNESS OF BREATH: ICD-10-CM

## 2017-10-26 DIAGNOSIS — R05 COUGH: ICD-10-CM

## 2017-10-26 PROCEDURE — 99214 OFFICE O/P EST MOD 30 MIN: CPT

## 2017-10-26 RX ORDER — UMECLIDINIUM BROMIDE AND VILANTEROL TRIFENATATE 62.5; 25 UG/1; UG/1
62.5-25 POWDER RESPIRATORY (INHALATION) DAILY
Qty: 1 | Refills: 5 | Status: DISCONTINUED | COMMUNITY
Start: 2017-04-26 | End: 2017-10-26

## 2017-10-26 RX ORDER — ALBUTEROL SULFATE 2.5 MG/3ML
(2.5 MG/3ML) SOLUTION RESPIRATORY (INHALATION)
Qty: 120 | Refills: 3 | Status: ACTIVE | COMMUNITY
Start: 2017-06-22 | End: 1900-01-01

## 2017-10-26 RX ORDER — FLUTICASONE FUROATE 200 UG/1
200 POWDER RESPIRATORY (INHALATION)
Qty: 1 | Refills: 5 | Status: DISCONTINUED | COMMUNITY
Start: 2017-04-26 | End: 2017-10-26

## 2017-10-26 RX ORDER — BUDESONIDE 0.5 MG/2ML
0.5 INHALANT ORAL
Qty: 60 | Refills: 5 | Status: ACTIVE | COMMUNITY
Start: 2017-10-26 | End: 1900-01-01

## 2017-10-26 RX ORDER — LEVALBUTEROL HYDROCHLORIDE 0.63 MG/3ML
0.63 SOLUTION RESPIRATORY (INHALATION)
Qty: 120 | Refills: 3 | Status: ACTIVE | COMMUNITY
Start: 2017-10-26 | End: 1900-01-01

## 2017-11-02 ENCOUNTER — FORM ENCOUNTER (OUTPATIENT)
Age: 82
End: 2017-11-02

## 2017-11-03 ENCOUNTER — OUTPATIENT (OUTPATIENT)
Dept: OUTPATIENT SERVICES | Facility: HOSPITAL | Age: 82
LOS: 1 days | End: 2017-11-03
Payer: MEDICARE

## 2017-11-03 ENCOUNTER — APPOINTMENT (OUTPATIENT)
Dept: CT IMAGING | Facility: IMAGING CENTER | Age: 82
End: 2017-11-03
Payer: MEDICARE

## 2017-11-03 DIAGNOSIS — J47.9 BRONCHIECTASIS, UNCOMPLICATED: ICD-10-CM

## 2017-11-03 DIAGNOSIS — R05 COUGH: ICD-10-CM

## 2017-11-03 PROCEDURE — 71250 CT THORAX DX C-: CPT

## 2017-11-03 PROCEDURE — 71250 CT THORAX DX C-: CPT | Mod: 26

## 2017-11-06 RX ORDER — LEVOFLOXACIN 500 MG/1
500 TABLET, FILM COATED ORAL DAILY
Qty: 10 | Refills: 0 | Status: ACTIVE | COMMUNITY
Start: 2017-11-06 | End: 1900-01-01

## 2017-11-28 ENCOUNTER — MEDICATION RENEWAL (OUTPATIENT)
Age: 82
End: 2017-11-28

## 2018-10-10 NOTE — ED ADULT NURSE NOTE - CHIEF COMPLAINT
arthrodesis in December. Posterior precautions,. Amb with cane post PT.  10/5 off cane + trendelenberg Right      Exercise/Equipment Resistance/Repetitions Other comments     Right THR 9/6/18 post/lat   Nustep 6 min level 1 Left ankle OA   GSS incline 20 sec x 3      R only No flex>90, no ADD, no IR   gumdrops pavel stance 1 min      Side board - stand on right slide left   X 10          Fitter abd only , f/b 1 thin x 10 each R/L     Step ups  Steps lat  Steps down 4\" x 15 R  4\" x 15 R  4\" x 10   Inc to 6\"   Leg Press 50# 2 x 10  Seat at 8    MHE  Abd, flex 15# -  flex/ /abd x 10      Knee ext - green 15 #  2  x 10 B         11 bars  reebok S/S f/b  Side step   1 min  10 ' x  6 yellow tb    Supine  Clamshell - 1 pillows    2 X 10 R   Ind - add yellow tb Hold theraband until next visit as pt was tired               Other Therapeutic Activities:  Pt was educated on PT POC, Diagnosis, Prognosis, pathomechanics as well as frequency and duration of scheduling future physical therapy appointments. Time was also taken on this day to answer all patient questions and participation in PT. Reviewed appointment policy in detail with patient and patient verbalized understanding. Home Exercise Program:  Patient instructed in the following for HEP:   Patient verbalized/demonstrated understanding and was issued written HEP. Timed Code Treatment Minutes: 40    Total Treatment Minutes: 40    Treatment/Activity Tolerance:  [] Patient tolerated treatment well [] Patient limited by fatigue  [] Patient limited by pain  [] Patient limited by other medical complications  [] Other:     Prognosis: [x] Good [] Fair  [] Poor    Goals:    Short term goals  Time Frame for Short term goals: Discharge  Short term goal 1: Indep with HEP  Short term goal 2: Decrease pain to 0-1/occasional right hip to allow normal self care, light ADL  Short term goal 3:  Increase left hip strength to 4/5 to normalize gait pattern and eliminate The patient is a 89y Male complaining of

## 2019-01-01 NOTE — PROGRESS NOTE ADULT - SUBJECTIVE AND OBJECTIVE BOX
ORTHO  Patient is a 89y old  Male who presents with a chief complaint of Left Hip Pain (19 Sep 2017 16:48)    Pt. resting without complaint    VS-  T(C): 36.6 (09-23-17 @ 04:41), Max: 37.1 (09-22-17 @ 13:48)  HR: 84 (09-23-17 @ 04:41) (84 - 127)  BP: 109/65 (09-23-17 @ 04:41) (98/64 - 112/67)  RR: 18 (09-23-17 @ 04:41) (16 - 18)  SpO2: 95% (09-23-17 @ 04:41) (92% - 96%)  Wt(kg): --    M.S. A&O  Extremity- Left LE- dressings with min serous sanganous drainage, changed                             Wounds C/D/I  Neuro-              Motor- (+) Ankle DF/PF              Sensation- grossly intact to light touch  Calves- soft, nontender- PAS                               9.2    8.0   )-----------( 294      ( 22 Sep 2017 16:39 )             27.6     09-22    130<L>  |  95<L>  |  16  ----------------------------<  106<H>  4.4   |  21<L>  |  0.86    Ca    7.9<L>      22 Sep 2017 06:01 Statement Selected

## 2022-06-13 NOTE — ED ADULT NURSE NOTE - NS ED NURSE LEVEL OF CONSCIOUSNESS MENTAL STATUS
Normal rate, regular rhythm.  Heart sounds S1, S2.  No murmurs, rubs or gallops. no ble edema
Awake/Alert